# Patient Record
Sex: MALE | Race: WHITE | Employment: UNEMPLOYED | URBAN - NONMETROPOLITAN AREA
[De-identification: names, ages, dates, MRNs, and addresses within clinical notes are randomized per-mention and may not be internally consistent; named-entity substitution may affect disease eponyms.]

---

## 2018-08-03 ENCOUNTER — NURSE TRIAGE (OUTPATIENT)
Dept: ADMINISTRATIVE | Age: 18
End: 2018-08-03

## 2018-08-03 NOTE — TELEPHONE ENCOUNTER
Reason for Disposition   [1] Heart beating very rapidly (e.g., > 140 / minute) AND [2] present now  (Exception: during exercise)    Answer Assessment - Initial Assessment Questions  1. DESCRIPTION: \"Please describe your heart rate or heart beat that you are having\" (e.g., fast/slow, regular/irregular, skipped or extra beats, \"palpitations\")      States that he was in a hospital in [place] for suicidal thoughts and was released on 7/27 . The day before  Started taking Remeron for 4 days in the hospital and then he took one dose at home. Is pretty sure that is the med. But he is still so tired. Cannot seem to get over it   2. ONSET: \"When did it start? \" (Minutes, hours or days)       7/28  3. DURATION: \"How long does it last\" (e.g., seconds, minutes, hours)     contiinues tired-canceling things right and L as he is too tired- missed [employer] meeting this morning because he was too tired to go 4. PATTERN \"Does it come and go, or has it been constant since it started? \"  \"Does it get worse with exertion? \"   \"Are you feeling it now? \"      No-just week and feels like his heart is bounding some, cannot determine what the rate is  5. TAP: \"Using your hand, can you tap out what you are feeling on a chair or table in front of you, so that I can hear? \" (Note: not all patients can do this)        Did not have him do that   6. HEART RATE: \"Can you tell me your heart rate? \" \"How many beats in 15 seconds? \"  (Note: not all patients can do this)        Not sure   7. RECURRENT SYMPTOM: \"Have you ever had this before? \" If so, ask: \"When was the last time? \" and \"What happened that time? \"       This morning q  8. CAUSE: \"What do you think is causing the palpitations? \"      Thinks that it was the Remeron -  9. CARDIAC HISTORY: \"Do you have any history of heart disease? \" (e.g., heart attack, angina, bypass surgery, angioplasty, arrhythmia)       none  10. OTHER SYMPTOMS: \"Do you have any other symptoms? \" (e.g., dizziness, chest pain, sweating, difficulty breathing)      none  11. PREGNANCY: \"Is there any chance you are pregnant? \" \"When was your last menstrual period? \"      na    Protocols used: HEART RATE AND HEARTBEAT QUESTIONS-ADULT-AH

## 2018-10-08 ENCOUNTER — HOSPITAL ENCOUNTER (EMERGENCY)
Age: 18
Discharge: HOME | End: 2018-10-09
Payer: COMMERCIAL

## 2018-10-08 VITALS
HEIGHT: 65 IN | BODY MASS INDEX: 23.32 KG/M2 | HEART RATE: 81 BPM | RESPIRATION RATE: 17 BRPM | WEIGHT: 140 LBS | OXYGEN SATURATION: 98 % | TEMPERATURE: 98.4 F | DIASTOLIC BLOOD PRESSURE: 87 MMHG | SYSTOLIC BLOOD PRESSURE: 124 MMHG

## 2018-10-08 DIAGNOSIS — Z00.8 ENCOUNTER FOR PSYCHOLOGICAL EVALUATION: Primary | ICD-10-CM

## 2018-10-08 LAB
ACETAMINOPHEN LEVEL: < 5 UG/ML (ref 0–20)
ALBUMIN SERPL-MCNC: 5.6 G/DL (ref 3.5–5.1)
ALP BLD-CCNC: 72 U/L (ref 38–126)
ALT SERPL-CCNC: 19 U/L (ref 11–66)
AMPHETAMINE+METHAMPHETAMINE URINE SCREEN: NEGATIVE
ANION GAP SERPL CALCULATED.3IONS-SCNC: 16 MEQ/L (ref 8–16)
AST SERPL-CCNC: 26 U/L (ref 5–40)
BARBITURATE QUANTITATIVE URINE: NEGATIVE
BASOPHILS # BLD: 0.3 %
BASOPHILS ABSOLUTE: 0 THOU/MM3 (ref 0–0.1)
BENZODIAZEPINE QUANTITATIVE URINE: NEGATIVE
BILIRUB SERPL-MCNC: 3.3 MG/DL (ref 0.3–1.2)
BILIRUBIN DIRECT: < 0.2 MG/DL (ref 0–0.3)
BILIRUBIN URINE: NEGATIVE
BLOOD, URINE: NEGATIVE
BUN BLDV-MCNC: 10 MG/DL (ref 7–22)
CALCIUM SERPL-MCNC: 9.8 MG/DL (ref 8.5–10.5)
CANNABINOID QUANTITATIVE URINE: NEGATIVE
CHARACTER, URINE: CLEAR
CHLORIDE BLD-SCNC: 99 MEQ/L (ref 98–111)
CO2: 23 MEQ/L (ref 23–33)
COCAINE METABOLITE QUANTITATIVE URINE: NEGATIVE
COLOR: YELLOW
CREAT SERPL-MCNC: 0.7 MG/DL (ref 0.4–1.2)
EOSINOPHIL # BLD: 0.2 %
EOSINOPHILS ABSOLUTE: 0 THOU/MM3 (ref 0–0.4)
ERYTHROCYTE [DISTWIDTH] IN BLOOD BY AUTOMATED COUNT: 11.9 % (ref 11.5–14.5)
ERYTHROCYTE [DISTWIDTH] IN BLOOD BY AUTOMATED COUNT: 36.8 FL (ref 35–45)
ETHYL ALCOHOL, SERUM: < 0.01 %
GFR SERPL CREATININE-BSD FRML MDRD: > 90 ML/MIN/1.73M2
GLUCOSE BLD-MCNC: 94 MG/DL (ref 70–108)
GLUCOSE URINE: NEGATIVE MG/DL
HCT VFR BLD CALC: 47.2 % (ref 42–52)
HEMOGLOBIN: 17.4 GM/DL (ref 14–18)
IMMATURE GRANS (ABS): 0.03 THOU/MM3 (ref 0–0.07)
IMMATURE GRANULOCYTES: 0.3 %
KETONES, URINE: 15
LEUKOCYTE ESTERASE, URINE: NEGATIVE
LYMPHOCYTES # BLD: 9.8 %
LYMPHOCYTES ABSOLUTE: 1.1 THOU/MM3 (ref 1–4.8)
MCH RBC QN AUTO: 31.5 PG (ref 26–33)
MCHC RBC AUTO-ENTMCNC: 36.9 GM/DL (ref 32.2–35.5)
MCV RBC AUTO: 85.4 FL (ref 80–94)
MONOCYTES # BLD: 6 %
MONOCYTES ABSOLUTE: 0.7 THOU/MM3 (ref 0.4–1.3)
NITRITE, URINE: NEGATIVE
NUCLEATED RED BLOOD CELLS: 0 /100 WBC
OPIATES, URINE: NEGATIVE
OSMOLALITY CALCULATION: 274.5 MOSMOL/KG (ref 275–300)
OXYCODONE: NEGATIVE
PH UA: 6
PHENCYCLIDINE QUANTITATIVE URINE: NEGATIVE
PLATELET # BLD: 244 THOU/MM3 (ref 130–400)
PMV BLD AUTO: 10.1 FL (ref 9.4–12.4)
POTASSIUM SERPL-SCNC: 3.8 MEQ/L (ref 3.5–5.2)
PROTEIN UA: NEGATIVE
RBC # BLD: 5.53 MILL/MM3 (ref 4.7–6.1)
SALICYLATE, SERUM: < 0.3 MG/DL (ref 2–10)
SEG NEUTROPHILS: 83.4 %
SEGMENTED NEUTROPHILS ABSOLUTE COUNT: 9.3 THOU/MM3 (ref 1.8–7.7)
SODIUM BLD-SCNC: 138 MEQ/L (ref 135–145)
SPECIFIC GRAVITY, URINE: 1.01 (ref 1–1.03)
TOTAL PROTEIN: 8.2 G/DL (ref 6.1–8)
TSH SERPL DL<=0.05 MIU/L-ACNC: 2.14 UIU/ML (ref 0.4–4.2)
UROBILINOGEN, URINE: 0.2 EU/DL
WBC # BLD: 11.1 THOU/MM3 (ref 4.8–10.8)

## 2018-10-08 PROCEDURE — 99285 EMERGENCY DEPT VISIT HI MDM: CPT

## 2018-10-08 PROCEDURE — G0480 DRUG TEST DEF 1-7 CLASSES: HCPCS

## 2018-10-08 PROCEDURE — 36415 COLL VENOUS BLD VENIPUNCTURE: CPT

## 2018-10-08 PROCEDURE — 80053 COMPREHEN METABOLIC PANEL: CPT

## 2018-10-08 PROCEDURE — 82248 BILIRUBIN DIRECT: CPT

## 2018-10-08 PROCEDURE — 81003 URINALYSIS AUTO W/O SCOPE: CPT

## 2018-10-08 PROCEDURE — 84443 ASSAY THYROID STIM HORMONE: CPT

## 2018-10-08 PROCEDURE — 85025 COMPLETE CBC W/AUTO DIFF WBC: CPT

## 2018-10-08 PROCEDURE — 80307 DRUG TEST PRSMV CHEM ANLYZR: CPT

## 2018-10-08 ASSESSMENT — ENCOUNTER SYMPTOMS
VOMITING: 0
NAUSEA: 0
DIARRHEA: 0
COUGH: 0
CONSTIPATION: 0
ABDOMINAL PAIN: 0
COLOR CHANGE: 0
SORE THROAT: 0
EYE DISCHARGE: 0
EYE REDNESS: 0
BACK PAIN: 0
SHORTNESS OF BREATH: 0
RHINORRHEA: 0
WHEEZING: 0

## 2018-10-08 ASSESSMENT — PAIN DESCRIPTION - LOCATION: LOCATION: BACK;KNEE

## 2018-10-08 ASSESSMENT — PAIN SCALES - GENERAL: PAINLEVEL_OUTOF10: 8

## 2018-10-08 ASSESSMENT — SLEEP AND FATIGUE QUESTIONNAIRES
DO YOU USE A SLEEP AID: NO
DO YOU HAVE DIFFICULTY SLEEPING: NO
AVERAGE NUMBER OF SLEEP HOURS: 8

## 2018-10-08 ASSESSMENT — PAIN DESCRIPTION - PAIN TYPE: TYPE: ACUTE PAIN

## 2018-10-08 ASSESSMENT — LIFESTYLE VARIABLES: HISTORY_ALCOHOL_USE: NO

## 2018-10-08 ASSESSMENT — PAIN DESCRIPTION - ORIENTATION: ORIENTATION: RIGHT;LEFT

## 2018-10-09 NOTE — ED PROVIDER NOTES
for the following: Alb 5.6 (*)     Total Bilirubin 3.3 (*)     Total Protein 8.2 (*)     All other components within normal limits   SALICYLATE LEVEL - Abnormal; Notable for the following:     Salicylate, Serum < 0.3 (*)     All other components within normal limits   URINE RT REFLEX TO CULTURE - Abnormal; Notable for the following:     Ketones, Urine 15 (*)     All other components within normal limits   OSMOLALITY - Abnormal; Notable for the following:     Osmolality Calc 274.5 (*)     All other components within normal limits   BASIC METABOLIC PANEL   TSH WITHOUT REFLEX   ACETAMINOPHEN LEVEL   ETHANOL   URINE DRUG SCREEN   ANION GAP   GLOMERULAR FILTRATION RATE, ESTIMATED       EMERGENCY DEPARTMENT COURSE:   Vitals:    Vitals:    10/08/18 2132   BP: 124/87   Pulse: 81   Resp: 17   Temp: 98.4 °F (36.9 °C)   TempSrc: Oral   SpO2: 98%   Weight: 140 lb (63.5 kg)   Height: 5' 5\" (1.651 m)     The patient was seen and evaluated within the ED today after making suicidal statements. Within the department, I observed the patient's vital signs to be within acceptable range. His physical exam was unremarkable. Laboratory work was reassuring. I observed the patient's condition to remain stable during the duration of the stay. Dr. Ranulfo Nihcols, Psychiatry, does not believe that the patient is at risk of harming himself or others and recommends discharge to home with outpatient psychiatric follow-up per DEYANIRA. DEYANIRA explained the proposed course of treatment to the patient, who was amenable to the treatment and discharge decisions per Psychiatry. He was discharged home in stable condition, and the patient will return to the ED if the symptoms become more severe in nature or otherwise change.     I estimate there is LOW risk for SUICIDAL BEHAVIOR, HOMICIDAL BEHAVIOR, PSYCHOSIS, DANGEROUS OR VIOLENT BEHAVIOR, DISORIENTATION, OR MENTAL HEALTH CONDITION REQUIRING HOSPITALIZATION, thus I consider the discharge disposition

## 2018-10-09 NOTE — ED NOTES
PT is resting on cart quietly. Pt is given food per provider. Pt is updated on POC. Sitter at bedside. Will continue to monitor the patient.      Nkechi Vaughn RN  10/08/18 8861

## 2018-10-09 NOTE — PROGRESS NOTES
Provisional Diagnosis:   Mood Disorder NOS     Psychosocial and Contextual Factors:   Problems with primary support system    C-SSRS Summary:      Patient: X  Family: X  Agency: [place] [employer], [facility]    Substance Abuse  Denies     Present Suicidal Behavior:      Verbal: Denies     Attempt:  Denies     Past Suicidal Behavior:     Verbal: History of suicidal thoughts    Attempt: Denies       Self-Injurious/Self-Mutilation:  Denies     Trauma Identified: \"Tons\", no further details disclosed       Protective Factors:  Pts family are supportive, pt is linked to family and individual counseling       Risk Factors:  Problems with primary support system, non-compliant with taking prescribed psychotropic medication, history of suicidal thoughts, access to weapon at home, history of inpatient psychiatric treatment    Clinical Summary:  Pt is a 19year old male escorted to [facility] ED by [place] from the [facility] in [place] due to suicidal thoughts. Police informed ED Staff that pt made suicidal statements to his counselor. Pt reportedly was at a family counseling session with his mother, father and sister. Pt state \"I tried to express my feelings of having no negative intent\". In doing so pt reportedly used the following analogy \"I would blow my brains out but I don't have negative intent\". Pt state his words were misconstrued and state \"I would never kill myself\". Pt has access to to a gun at home \"I'm not going to  the gun, take it and use it\". Pt lives with his mother, father and sister. Pt denies current suicidal/homicidal thought, denies hallucinations, is preoccupied with God. Clinician inquired about a history of suicidal thoughts, pt stated \"go to another question\". Clinician inquired if pt had a history of inpatient psychiatric treatment and received the same response. Pt then stated \"God is asking me to tell you yes to both of those questions\".   Pt denies a history of suicide

## 2018-12-29 ENCOUNTER — HOSPITAL ENCOUNTER (INPATIENT)
Age: 18
LOS: 4 days | Discharge: HOME | DRG: 885 | End: 2019-01-02
Attending: PSYCHIATRY & NEUROLOGY | Admitting: PSYCHIATRY & NEUROLOGY
Payer: COMMERCIAL

## 2018-12-29 PROCEDURE — 1240000000 HC EMOTIONAL WELLNESS R&B

## 2018-12-29 PROCEDURE — 6370000000 HC RX 637 (ALT 250 FOR IP): Performed by: NURSE PRACTITIONER

## 2018-12-29 PROCEDURE — APPSS60 APP SPLIT SHARED TIME 46-60 MINUTES: Performed by: NURSE PRACTITIONER

## 2018-12-29 PROCEDURE — 99222 1ST HOSP IP/OBS MODERATE 55: CPT | Performed by: PSYCHIATRY & NEUROLOGY

## 2018-12-29 RX ORDER — HYDROXYZINE PAMOATE 50 MG/1
50 CAPSULE ORAL 3 TIMES DAILY PRN
Status: DISCONTINUED | OUTPATIENT
Start: 2018-12-29 | End: 2019-01-02 | Stop reason: HOSPADM

## 2018-12-29 RX ORDER — TRAZODONE HYDROCHLORIDE 50 MG/1
50 TABLET ORAL NIGHTLY PRN
Status: DISCONTINUED | OUTPATIENT
Start: 2018-12-29 | End: 2019-01-02 | Stop reason: HOSPADM

## 2018-12-29 RX ORDER — MAGNESIUM HYDROXIDE/ALUMINUM HYDROXICE/SIMETHICONE 120; 1200; 1200 MG/30ML; MG/30ML; MG/30ML
30 SUSPENSION ORAL EVERY 6 HOURS PRN
Status: DISCONTINUED | OUTPATIENT
Start: 2018-12-29 | End: 2019-01-02 | Stop reason: HOSPADM

## 2018-12-29 RX ORDER — PALIPERIDONE 3 MG/1
3 TABLET, EXTENDED RELEASE ORAL DAILY
Status: DISCONTINUED | OUTPATIENT
Start: 2018-12-29 | End: 2018-12-30

## 2018-12-29 RX ORDER — ACETAMINOPHEN 325 MG/1
650 TABLET ORAL EVERY 4 HOURS PRN
Status: DISCONTINUED | OUTPATIENT
Start: 2018-12-29 | End: 2019-01-02 | Stop reason: HOSPADM

## 2018-12-29 RX ADMIN — PALIPERIDONE 3 MG: 3 TABLET, EXTENDED RELEASE ORAL at 11:26

## 2018-12-29 RX ADMIN — PALIPERIDONE 3 MG: 3 TABLET, EXTENDED RELEASE ORAL at 10:01

## 2018-12-29 ASSESSMENT — PAIN SCALES - GENERAL
PAINLEVEL_OUTOF10: 3
PAINLEVEL_OUTOF10: 3

## 2018-12-29 ASSESSMENT — PAIN DESCRIPTION - ORIENTATION: ORIENTATION: POSTERIOR

## 2018-12-29 ASSESSMENT — PAIN DESCRIPTION - ONSET
ONSET: ON-GOING
ONSET: ON-GOING

## 2018-12-29 ASSESSMENT — PAIN DESCRIPTION - FREQUENCY
FREQUENCY: CONTINUOUS
FREQUENCY: CONTINUOUS

## 2018-12-29 ASSESSMENT — PAIN DESCRIPTION - PROGRESSION
CLINICAL_PROGRESSION: NOT CHANGED
CLINICAL_PROGRESSION: NOT CHANGED

## 2018-12-29 ASSESSMENT — PAIN DESCRIPTION - LOCATION: LOCATION: NECK

## 2018-12-29 ASSESSMENT — PAIN DESCRIPTION - DESCRIPTORS
DESCRIPTORS: ACHING
DESCRIPTORS: ACHING

## 2018-12-29 ASSESSMENT — PAIN DESCRIPTION - PAIN TYPE
TYPE: CHRONIC PAIN
TYPE: CHRONIC PAIN

## 2018-12-30 PROBLEM — F33.9 MAJOR DEPRESSIVE DISORDER, RECURRENT (HCC): Status: ACTIVE | Noted: 2018-12-30

## 2018-12-30 PROCEDURE — 99232 SBSQ HOSP IP/OBS MODERATE 35: CPT | Performed by: PSYCHIATRY & NEUROLOGY

## 2018-12-30 PROCEDURE — 1240000000 HC EMOTIONAL WELLNESS R&B

## 2018-12-30 PROCEDURE — APPSS15 APP SPLIT SHARED TIME 0-15 MINUTES: Performed by: NURSE PRACTITIONER

## 2018-12-30 PROCEDURE — 6370000000 HC RX 637 (ALT 250 FOR IP): Performed by: NURSE PRACTITIONER

## 2018-12-30 RX ORDER — PALIPERIDONE 3 MG/1
3 TABLET, EXTENDED RELEASE ORAL 2 TIMES DAILY
Status: DISCONTINUED | OUTPATIENT
Start: 2018-12-30 | End: 2019-01-02

## 2018-12-30 RX ADMIN — PALIPERIDONE 3 MG: 3 TABLET, EXTENDED RELEASE ORAL at 08:49

## 2018-12-30 ASSESSMENT — PAIN SCALES - GENERAL
PAINLEVEL_OUTOF10: 0
PAINLEVEL_OUTOF10: 0

## 2018-12-31 PROCEDURE — 1240000000 HC EMOTIONAL WELLNESS R&B

## 2018-12-31 PROCEDURE — 6370000000 HC RX 637 (ALT 250 FOR IP): Performed by: PSYCHIATRY & NEUROLOGY

## 2018-12-31 PROCEDURE — 90833 PSYTX W PT W E/M 30 MIN: CPT | Performed by: PSYCHIATRY & NEUROLOGY

## 2018-12-31 PROCEDURE — 6360000002 HC RX W HCPCS: Performed by: PSYCHIATRY & NEUROLOGY

## 2018-12-31 PROCEDURE — 99232 SBSQ HOSP IP/OBS MODERATE 35: CPT | Performed by: PSYCHIATRY & NEUROLOGY

## 2018-12-31 RX ORDER — LORAZEPAM 2 MG/ML
1 INJECTION INTRAMUSCULAR ONCE
Status: COMPLETED | OUTPATIENT
Start: 2018-12-31 | End: 2018-12-31

## 2018-12-31 RX ORDER — BENZTROPINE MESYLATE 1 MG/1
0.5 TABLET ORAL 2 TIMES DAILY
Status: DISCONTINUED | OUTPATIENT
Start: 2018-12-31 | End: 2019-01-02

## 2018-12-31 RX ADMIN — LORAZEPAM 1 MG: 2 INJECTION, SOLUTION INTRAMUSCULAR; INTRAVENOUS at 10:13

## 2018-12-31 RX ADMIN — PALIPERIDONE 3 MG: 3 TABLET, EXTENDED RELEASE ORAL at 20:42

## 2018-12-31 RX ADMIN — PALIPERIDONE 3 MG: 3 TABLET, EXTENDED RELEASE ORAL at 10:17

## 2018-12-31 RX ADMIN — ACETAMINOPHEN 650 MG: 325 TABLET ORAL at 20:42

## 2018-12-31 RX ADMIN — BENZTROPINE MESYLATE 0.5 MG: 1 TABLET ORAL at 10:11

## 2018-12-31 ASSESSMENT — SLEEP AND FATIGUE QUESTIONNAIRES
DIFFICULTY STAYING ASLEEP: YES
DIFFICULTY ARISING: YES
DO YOU HAVE DIFFICULTY SLEEPING: YES
DIFFICULTY FALLING ASLEEP: YES
DO YOU USE A SLEEP AID: NO
AVERAGE NUMBER OF SLEEP HOURS: 5
RESTFUL SLEEP: NO
SLEEP PATTERN: DIFFICULTY FALLING ASLEEP;DIFFICULTY ARISING;DISTURBED/INTERRUPTED SLEEP;RESTLESSNESS

## 2018-12-31 ASSESSMENT — PAIN SCALES - GENERAL
PAINLEVEL_OUTOF10: 2
PAINLEVEL_OUTOF10: 0

## 2018-12-31 ASSESSMENT — PAIN DESCRIPTION - LOCATION
LOCATION: ELBOW
LOCATION: BACK

## 2018-12-31 ASSESSMENT — PAIN DESCRIPTION - ONSET
ONSET: ON-GOING
ONSET: ON-GOING

## 2018-12-31 ASSESSMENT — LIFESTYLE VARIABLES: HISTORY_ALCOHOL_USE: NO

## 2018-12-31 ASSESSMENT — PAIN DESCRIPTION - DIRECTION: RADIATING_TOWARDS: ALL OVER

## 2018-12-31 ASSESSMENT — PAIN DESCRIPTION - PROGRESSION
CLINICAL_PROGRESSION: NOT CHANGED
CLINICAL_PROGRESSION: NOT CHANGED

## 2018-12-31 ASSESSMENT — PAIN DESCRIPTION - PAIN TYPE
TYPE: CHRONIC PAIN
TYPE: CHRONIC PAIN

## 2018-12-31 ASSESSMENT — PAIN DESCRIPTION - DESCRIPTORS
DESCRIPTORS: ACHING
DESCRIPTORS: ACHING

## 2018-12-31 ASSESSMENT — PAIN DESCRIPTION - FREQUENCY
FREQUENCY: INTERMITTENT
FREQUENCY: INTERMITTENT

## 2018-12-31 ASSESSMENT — PAIN DESCRIPTION - ORIENTATION: ORIENTATION: UPPER

## 2018-12-31 ASSESSMENT — PATIENT HEALTH QUESTIONNAIRE - PHQ9: SUM OF ALL RESPONSES TO PHQ QUESTIONS 1-9: 18

## 2019-01-01 PROCEDURE — 90833 PSYTX W PT W E/M 30 MIN: CPT | Performed by: PSYCHIATRY & NEUROLOGY

## 2019-01-01 PROCEDURE — 6370000000 HC RX 637 (ALT 250 FOR IP): Performed by: PSYCHIATRY & NEUROLOGY

## 2019-01-01 PROCEDURE — 1240000000 HC EMOTIONAL WELLNESS R&B

## 2019-01-01 PROCEDURE — 99232 SBSQ HOSP IP/OBS MODERATE 35: CPT | Performed by: PSYCHIATRY & NEUROLOGY

## 2019-01-01 RX ADMIN — BENZTROPINE MESYLATE 0.5 MG: 1 TABLET ORAL at 08:04

## 2019-01-01 RX ADMIN — HYDROXYZINE PAMOATE 50 MG: 50 CAPSULE ORAL at 10:48

## 2019-01-01 RX ADMIN — PALIPERIDONE 3 MG: 3 TABLET, EXTENDED RELEASE ORAL at 08:04

## 2019-01-01 RX ADMIN — PALIPERIDONE 3 MG: 3 TABLET, EXTENDED RELEASE ORAL at 20:24

## 2019-01-01 ASSESSMENT — PAIN SCALES - GENERAL
PAINLEVEL_OUTOF10: 0
PAINLEVEL_OUTOF10: 0

## 2019-01-02 VITALS
DIASTOLIC BLOOD PRESSURE: 75 MMHG | TEMPERATURE: 98 F | OXYGEN SATURATION: 99 % | SYSTOLIC BLOOD PRESSURE: 126 MMHG | RESPIRATION RATE: 18 BRPM | BODY MASS INDEX: 22.49 KG/M2 | HEART RATE: 91 BPM | WEIGHT: 135 LBS | HEIGHT: 65 IN

## 2019-01-02 PROCEDURE — 6370000000 HC RX 637 (ALT 250 FOR IP): Performed by: PSYCHIATRY & NEUROLOGY

## 2019-01-02 PROCEDURE — 99239 HOSP IP/OBS DSCHRG MGMT >30: CPT | Performed by: PSYCHIATRY & NEUROLOGY

## 2019-01-02 PROCEDURE — 5130000000 HC BRIDGE APPOINTMENT

## 2019-01-02 RX ORDER — BENZTROPINE MESYLATE 1 MG/1
0.5 TABLET ORAL NIGHTLY
Status: DISCONTINUED | OUTPATIENT
Start: 2019-01-03 | End: 2019-01-02 | Stop reason: HOSPADM

## 2019-01-02 RX ORDER — BENZTROPINE MESYLATE 0.5 MG/1
0.5 TABLET ORAL 2 TIMES DAILY
Qty: 60 TABLET | Refills: 0 | Status: CANCELLED | OUTPATIENT
Start: 2019-01-02

## 2019-01-02 RX ORDER — BENZTROPINE MESYLATE 0.5 MG/1
0.5 TABLET ORAL NIGHTLY
Qty: 30 TABLET | Refills: 0 | Status: ON HOLD | OUTPATIENT
Start: 2019-01-03 | End: 2019-01-22

## 2019-01-02 RX ORDER — PALIPERIDONE 3 MG/1
3 TABLET, EXTENDED RELEASE ORAL DAILY
Qty: 7 TABLET | Refills: 0 | Status: ON HOLD | OUTPATIENT
Start: 2019-01-03 | End: 2019-01-22 | Stop reason: HOSPADM

## 2019-01-02 RX ORDER — PALIPERIDONE 3 MG/1
3 TABLET, EXTENDED RELEASE ORAL 2 TIMES DAILY
Qty: 14 TABLET | Refills: 0 | Status: CANCELLED | OUTPATIENT
Start: 2019-01-02

## 2019-01-02 RX ORDER — PALIPERIDONE 3 MG/1
3 TABLET, EXTENDED RELEASE ORAL DAILY
Status: DISCONTINUED | OUTPATIENT
Start: 2019-01-03 | End: 2019-01-02 | Stop reason: HOSPADM

## 2019-01-02 RX ADMIN — PALIPERIDONE 3 MG: 3 TABLET, EXTENDED RELEASE ORAL at 08:44

## 2019-01-02 ASSESSMENT — PAIN SCALES - GENERAL: PAINLEVEL_OUTOF10: 0

## 2019-01-03 ENCOUNTER — TELEPHONE (OUTPATIENT)
Dept: ADMINISTRATIVE | Age: 19
End: 2019-01-03

## 2019-01-09 ENCOUNTER — HOSPITAL ENCOUNTER (OUTPATIENT)
Dept: PSYCHIATRY | Age: 19
Setting detail: THERAPIES SERIES
Discharge: HOME | End: 2019-01-09
Payer: COMMERCIAL

## 2019-01-09 PROCEDURE — 5130000000 HC BRIDGE APPOINTMENT

## 2019-01-13 ENCOUNTER — HOSPITAL ENCOUNTER (INPATIENT)
Age: 19
LOS: 9 days | Discharge: HOME | DRG: 885 | End: 2019-01-22
Attending: FAMILY MEDICINE | Admitting: PSYCHIATRY & NEUROLOGY
Payer: COMMERCIAL

## 2019-01-13 DIAGNOSIS — R45.851 SUICIDAL IDEATION: ICD-10-CM

## 2019-01-13 DIAGNOSIS — F20.9 SCHIZOPHRENIA, UNSPECIFIED TYPE (HCC): Primary | ICD-10-CM

## 2019-01-13 DIAGNOSIS — T88.7XXA MEDICATION SIDE EFFECT: ICD-10-CM

## 2019-01-13 DIAGNOSIS — R00.0 SINUS TACHYCARDIA: ICD-10-CM

## 2019-01-13 LAB
ALBUMIN SERPL-MCNC: 4.9 G/DL (ref 3.5–5.1)
ALP BLD-CCNC: 72 U/L (ref 38–126)
ALT SERPL-CCNC: 13 U/L (ref 11–66)
AMPHETAMINE+METHAMPHETAMINE URINE SCREEN: NEGATIVE
ANION GAP SERPL CALCULATED.3IONS-SCNC: 13 MEQ/L (ref 8–16)
AST SERPL-CCNC: 18 U/L (ref 5–40)
BACTERIA: ABNORMAL /HPF
BARBITURATE QUANTITATIVE URINE: NEGATIVE
BASOPHILS # BLD: 0.6 %
BASOPHILS ABSOLUTE: 0 THOU/MM3 (ref 0–0.1)
BENZODIAZEPINE QUANTITATIVE URINE: NEGATIVE
BILIRUB SERPL-MCNC: 1.8 MG/DL (ref 0.3–1.2)
BILIRUBIN DIRECT: 0.3 MG/DL (ref 0–0.3)
BILIRUBIN URINE: NEGATIVE
BLOOD, URINE: NEGATIVE
BUN BLDV-MCNC: 9 MG/DL (ref 7–22)
CALCIUM SERPL-MCNC: 9.4 MG/DL (ref 8.5–10.5)
CANNABINOID QUANTITATIVE URINE: NEGATIVE
CASTS 2: ABNORMAL /LPF
CASTS UA: ABNORMAL /LPF
CHARACTER, URINE: ABNORMAL
CHLORIDE BLD-SCNC: 102 MEQ/L (ref 98–111)
CO2: 26 MEQ/L (ref 23–33)
COCAINE METABOLITE QUANTITATIVE URINE: NEGATIVE
COLOR: YELLOW
CREAT SERPL-MCNC: 0.6 MG/DL (ref 0.4–1.2)
CRYSTALS, UA: ABNORMAL
EKG ATRIAL RATE: 115 BPM
EKG P AXIS: 72 DEGREES
EKG P-R INTERVAL: 150 MS
EKG Q-T INTERVAL: 334 MS
EKG QRS DURATION: 88 MS
EKG QTC CALCULATION (BAZETT): 462 MS
EKG R AXIS: 92 DEGREES
EKG T AXIS: 41 DEGREES
EKG VENTRICULAR RATE: 115 BPM
EOSINOPHIL # BLD: 1 %
EOSINOPHILS ABSOLUTE: 0.1 THOU/MM3 (ref 0–0.4)
EPITHELIAL CELLS, UA: ABNORMAL /HPF
ERYTHROCYTE [DISTWIDTH] IN BLOOD BY AUTOMATED COUNT: 11.8 % (ref 11.5–14.5)
ERYTHROCYTE [DISTWIDTH] IN BLOOD BY AUTOMATED COUNT: 37.5 FL (ref 35–45)
ETHYL ALCOHOL, SERUM: < 0.01 %
GFR SERPL CREATININE-BSD FRML MDRD: > 90 ML/MIN/1.73M2
GLUCOSE BLD-MCNC: 101 MG/DL (ref 70–108)
GLUCOSE URINE: NEGATIVE MG/DL
HCT VFR BLD CALC: 44.7 % (ref 42–52)
HEMOGLOBIN: 15.9 GM/DL (ref 14–18)
IMMATURE GRANS (ABS): 0.01 THOU/MM3 (ref 0–0.07)
IMMATURE GRANULOCYTES: 0.1 %
KETONES, URINE: NEGATIVE
LEUKOCYTE ESTERASE, URINE: NEGATIVE
LYMPHOCYTES # BLD: 15.5 %
LYMPHOCYTES ABSOLUTE: 1.1 THOU/MM3 (ref 1–4.8)
MAGNESIUM: 2 MG/DL (ref 1.6–2.4)
MCH RBC QN AUTO: 31.1 PG (ref 26–33)
MCHC RBC AUTO-ENTMCNC: 35.6 GM/DL (ref 32.2–35.5)
MCV RBC AUTO: 87.3 FL (ref 80–94)
MISCELLANEOUS 2: ABNORMAL
MONOCYTES # BLD: 5.5 %
MONOCYTES ABSOLUTE: 0.4 THOU/MM3 (ref 0.4–1.3)
NITRITE, URINE: NEGATIVE
NUCLEATED RED BLOOD CELLS: 0 /100 WBC
OPIATES, URINE: NEGATIVE
OSMOLALITY CALCULATION: 280.1 MOSMOL/KG (ref 275–300)
OXYCODONE: NEGATIVE
PH UA: 7.5
PHENCYCLIDINE QUANTITATIVE URINE: NEGATIVE
PLATELET # BLD: 209 THOU/MM3 (ref 130–400)
PMV BLD AUTO: 9.3 FL (ref 9.4–12.4)
POTASSIUM SERPL-SCNC: 3.6 MEQ/L (ref 3.5–5.2)
PRO-BNP: 26.7 PG/ML (ref 0–450)
PROTEIN UA: NEGATIVE
RBC # BLD: 5.12 MILL/MM3 (ref 4.7–6.1)
RBC URINE: ABNORMAL /HPF
RENAL EPITHELIAL, UA: ABNORMAL
SALICYLATE, SERUM: < 0.3 MG/DL (ref 2–10)
SEG NEUTROPHILS: 77.3 %
SEGMENTED NEUTROPHILS ABSOLUTE COUNT: 5.3 THOU/MM3 (ref 1.8–7.7)
SODIUM BLD-SCNC: 141 MEQ/L (ref 135–145)
SPECIFIC GRAVITY, URINE: 1.01 (ref 1–1.03)
TOTAL PROTEIN: 7.4 G/DL (ref 6.1–8)
TROPONIN T: < 0.01 NG/ML
TSH SERPL DL<=0.05 MIU/L-ACNC: 1.32 UIU/ML (ref 0.4–4.2)
UROBILINOGEN, URINE: 0.2 EU/DL
WBC # BLD: 6.9 THOU/MM3 (ref 4.8–10.8)
WBC UA: ABNORMAL /HPF
YEAST: ABNORMAL

## 2019-01-13 PROCEDURE — 93005 ELECTROCARDIOGRAM TRACING: CPT | Performed by: FAMILY MEDICINE

## 2019-01-13 PROCEDURE — 83735 ASSAY OF MAGNESIUM: CPT

## 2019-01-13 PROCEDURE — G0480 DRUG TEST DEF 1-7 CLASSES: HCPCS

## 2019-01-13 PROCEDURE — 85025 COMPLETE CBC W/AUTO DIFF WBC: CPT

## 2019-01-13 PROCEDURE — 80307 DRUG TEST PRSMV CHEM ANLYZR: CPT

## 2019-01-13 PROCEDURE — 80076 HEPATIC FUNCTION PANEL: CPT

## 2019-01-13 PROCEDURE — 2580000003 HC RX 258: Performed by: FAMILY MEDICINE

## 2019-01-13 PROCEDURE — 84443 ASSAY THYROID STIM HORMONE: CPT

## 2019-01-13 PROCEDURE — 96374 THER/PROPH/DIAG INJ IV PUSH: CPT

## 2019-01-13 PROCEDURE — 83880 ASSAY OF NATRIURETIC PEPTIDE: CPT

## 2019-01-13 PROCEDURE — 81001 URINALYSIS AUTO W/SCOPE: CPT

## 2019-01-13 PROCEDURE — 1240000000 HC EMOTIONAL WELLNESS R&B

## 2019-01-13 PROCEDURE — 99285 EMERGENCY DEPT VISIT HI MDM: CPT

## 2019-01-13 PROCEDURE — 80048 BASIC METABOLIC PNL TOTAL CA: CPT

## 2019-01-13 PROCEDURE — 84484 ASSAY OF TROPONIN QUANT: CPT

## 2019-01-13 PROCEDURE — 6360000002 HC RX W HCPCS: Performed by: FAMILY MEDICINE

## 2019-01-13 PROCEDURE — 36415 COLL VENOUS BLD VENIPUNCTURE: CPT

## 2019-01-13 PROCEDURE — 6370000000 HC RX 637 (ALT 250 FOR IP): Performed by: FAMILY MEDICINE

## 2019-01-13 RX ORDER — ACETAMINOPHEN 325 MG/1
650 TABLET ORAL EVERY 4 HOURS PRN
Status: DISCONTINUED | OUTPATIENT
Start: 2019-01-13 | End: 2019-01-22 | Stop reason: HOSPADM

## 2019-01-13 RX ORDER — LORAZEPAM 1 MG/1
1 TABLET ORAL 3 TIMES DAILY PRN
Status: DISCONTINUED | OUTPATIENT
Start: 2019-01-13 | End: 2019-01-14

## 2019-01-13 RX ORDER — HYDROXYZINE PAMOATE 50 MG/1
50 CAPSULE ORAL 3 TIMES DAILY PRN
Status: DISCONTINUED | OUTPATIENT
Start: 2019-01-13 | End: 2019-01-22 | Stop reason: HOSPADM

## 2019-01-13 RX ORDER — LORAZEPAM 2 MG/ML
1 INJECTION INTRAMUSCULAR 3 TIMES DAILY PRN
Status: DISCONTINUED | OUTPATIENT
Start: 2019-01-13 | End: 2019-01-14

## 2019-01-13 RX ORDER — LORAZEPAM 1 MG/1
1 TABLET ORAL EVERY 6 HOURS PRN
Status: ON HOLD | COMMUNITY
End: 2019-01-22 | Stop reason: HOSPADM

## 2019-01-13 RX ORDER — 0.9 % SODIUM CHLORIDE 0.9 %
2000 INTRAVENOUS SOLUTION INTRAVENOUS ONCE
Status: COMPLETED | OUTPATIENT
Start: 2019-01-13 | End: 2019-01-13

## 2019-01-13 RX ORDER — TRAZODONE HYDROCHLORIDE 50 MG/1
50 TABLET ORAL NIGHTLY PRN
Status: DISCONTINUED | OUTPATIENT
Start: 2019-01-13 | End: 2019-01-22 | Stop reason: HOSPADM

## 2019-01-13 RX ORDER — LORAZEPAM 1 MG/1
1 TABLET ORAL ONCE
Status: COMPLETED | OUTPATIENT
Start: 2019-01-13 | End: 2019-01-13

## 2019-01-13 RX ORDER — DIPHENHYDRAMINE HYDROCHLORIDE 50 MG/ML
25 INJECTION INTRAMUSCULAR; INTRAVENOUS ONCE
Status: COMPLETED | OUTPATIENT
Start: 2019-01-13 | End: 2019-01-13

## 2019-01-13 RX ORDER — MAGNESIUM HYDROXIDE/ALUMINUM HYDROXICE/SIMETHICONE 120; 1200; 1200 MG/30ML; MG/30ML; MG/30ML
30 SUSPENSION ORAL EVERY 6 HOURS PRN
Status: DISCONTINUED | OUTPATIENT
Start: 2019-01-13 | End: 2019-01-22 | Stop reason: HOSPADM

## 2019-01-13 RX ADMIN — DIPHENHYDRAMINE HYDROCHLORIDE 25 MG: 50 INJECTION, SOLUTION INTRAMUSCULAR; INTRAVENOUS at 18:38

## 2019-01-13 RX ADMIN — LORAZEPAM 1 MG: 1 TABLET ORAL at 19:34

## 2019-01-13 RX ADMIN — SODIUM CHLORIDE 2000 ML: 9 INJECTION, SOLUTION INTRAVENOUS at 17:04

## 2019-01-13 ASSESSMENT — ENCOUNTER SYMPTOMS
DIARRHEA: 0
SHORTNESS OF BREATH: 0
WHEEZING: 0
VOMITING: 0
SORE THROAT: 0
COUGH: 0
ABDOMINAL PAIN: 0
EYE REDNESS: 0
NAUSEA: 0
BACK PAIN: 0
EYE DISCHARGE: 0
RHINORRHEA: 0

## 2019-01-13 ASSESSMENT — SLEEP AND FATIGUE QUESTIONNAIRES
DIFFICULTY STAYING ASLEEP: YES
DO YOU USE A SLEEP AID: NO
DO YOU HAVE DIFFICULTY SLEEPING: YES
DIFFICULTY ARISING: YES
AVERAGE NUMBER OF SLEEP HOURS: 8
DIFFICULTY FALLING ASLEEP: YES
SLEEP PATTERN: RESTLESSNESS
DIFFICULTY FALLING ASLEEP: YES
RESTFUL SLEEP: YES
DIFFICULTY STAYING ASLEEP: YES
DO YOU USE A SLEEP AID: NO
AVERAGE NUMBER OF SLEEP HOURS: 8
DIFFICULTY ARISING: YES
DO YOU HAVE DIFFICULTY SLEEPING: YES
RESTFUL SLEEP: YES

## 2019-01-13 ASSESSMENT — LIFESTYLE VARIABLES
HISTORY_ALCOHOL_USE: NO
HISTORY_ALCOHOL_USE: NO

## 2019-01-13 ASSESSMENT — PAIN SCALES - WONG BAKER: WONGBAKER_NUMERICALRESPONSE: 4

## 2019-01-13 ASSESSMENT — PATIENT HEALTH QUESTIONNAIRE - PHQ9
SUM OF ALL RESPONSES TO PHQ QUESTIONS 1-9: 23
SUM OF ALL RESPONSES TO PHQ QUESTIONS 1-9: 23

## 2019-01-13 ASSESSMENT — PAIN DESCRIPTION - PAIN TYPE: TYPE: ACUTE PAIN

## 2019-01-13 ASSESSMENT — PAIN DESCRIPTION - LOCATION: LOCATION: CHEST

## 2019-01-13 ASSESSMENT — PAIN SCALES - GENERAL: PAINLEVEL_OUTOF10: 0

## 2019-01-14 PROCEDURE — 6360000002 HC RX W HCPCS

## 2019-01-14 PROCEDURE — 93010 ELECTROCARDIOGRAM REPORT: CPT | Performed by: NUCLEAR MEDICINE

## 2019-01-14 PROCEDURE — 6370000000 HC RX 637 (ALT 250 FOR IP): Performed by: PSYCHIATRY & NEUROLOGY

## 2019-01-14 PROCEDURE — 1240000000 HC EMOTIONAL WELLNESS R&B

## 2019-01-14 PROCEDURE — 6370000000 HC RX 637 (ALT 250 FOR IP): Performed by: PHYSICIAN ASSISTANT

## 2019-01-14 PROCEDURE — 99222 1ST HOSP IP/OBS MODERATE 55: CPT | Performed by: PSYCHIATRY & NEUROLOGY

## 2019-01-14 PROCEDURE — APPSS30 APP SPLIT SHARED TIME 16-30 MINUTES: Performed by: PHYSICIAN ASSISTANT

## 2019-01-14 RX ORDER — DIPHENHYDRAMINE HYDROCHLORIDE 50 MG/ML
25 INJECTION INTRAMUSCULAR; INTRAVENOUS ONCE
Status: DISCONTINUED | OUTPATIENT
Start: 2019-01-14 | End: 2019-01-19

## 2019-01-14 RX ORDER — PROPRANOLOL HYDROCHLORIDE 10 MG/1
10 TABLET ORAL 3 TIMES DAILY
Status: DISCONTINUED | OUTPATIENT
Start: 2019-01-14 | End: 2019-01-16

## 2019-01-14 RX ORDER — DIPHENHYDRAMINE HYDROCHLORIDE 50 MG/ML
25 INJECTION INTRAMUSCULAR; INTRAVENOUS EVERY 6 HOURS PRN
Status: DISCONTINUED | OUTPATIENT
Start: 2019-01-14 | End: 2019-01-14

## 2019-01-14 RX ORDER — LORAZEPAM 0.5 MG/1
0.5 TABLET ORAL 3 TIMES DAILY PRN
Status: DISCONTINUED | OUTPATIENT
Start: 2019-01-14 | End: 2019-01-22 | Stop reason: HOSPADM

## 2019-01-14 RX ORDER — LORAZEPAM 2 MG/ML
0.5 INJECTION INTRAMUSCULAR 3 TIMES DAILY PRN
Status: DISCONTINUED | OUTPATIENT
Start: 2019-01-14 | End: 2019-01-22 | Stop reason: HOSPADM

## 2019-01-14 RX ORDER — DIPHENHYDRAMINE HYDROCHLORIDE 50 MG/ML
INJECTION INTRAMUSCULAR; INTRAVENOUS
Status: COMPLETED
Start: 2019-01-14 | End: 2019-01-14

## 2019-01-14 RX ADMIN — PROPRANOLOL HYDROCHLORIDE 10 MG: 10 TABLET ORAL at 21:11

## 2019-01-14 RX ADMIN — HYDROXYZINE PAMOATE 50 MG: 50 CAPSULE ORAL at 15:24

## 2019-01-14 RX ADMIN — PROPRANOLOL HYDROCHLORIDE 10 MG: 10 TABLET ORAL at 11:28

## 2019-01-14 RX ADMIN — ACETAMINOPHEN 650 MG: 325 TABLET ORAL at 09:25

## 2019-01-14 RX ADMIN — DIPHENHYDRAMINE HYDROCHLORIDE 25 MG: 50 INJECTION, SOLUTION INTRAMUSCULAR; INTRAVENOUS at 18:38

## 2019-01-14 RX ADMIN — ACETAMINOPHEN 650 MG: 325 TABLET ORAL at 16:40

## 2019-01-14 RX ADMIN — ACETAMINOPHEN 650 MG: 325 TABLET ORAL at 21:11

## 2019-01-14 RX ADMIN — PROPRANOLOL HYDROCHLORIDE 10 MG: 10 TABLET ORAL at 15:23

## 2019-01-14 ASSESSMENT — PAIN DESCRIPTION - PAIN TYPE
TYPE: ACUTE PAIN
TYPE: ACUTE PAIN

## 2019-01-14 ASSESSMENT — PAIN SCALES - WONG BAKER: WONGBAKER_NUMERICALRESPONSE: 4

## 2019-01-14 ASSESSMENT — PAIN DESCRIPTION - ONSET: ONSET: ON-GOING

## 2019-01-14 ASSESSMENT — SLEEP AND FATIGUE QUESTIONNAIRES
AVERAGE NUMBER OF SLEEP HOURS: 8
DO YOU HAVE DIFFICULTY SLEEPING: YES
DO YOU USE A SLEEP AID: NO
DIFFICULTY FALLING ASLEEP: YES
SLEEP PATTERN: RESTLESSNESS
DIFFICULTY ARISING: YES
RESTFUL SLEEP: YES
DIFFICULTY STAYING ASLEEP: YES

## 2019-01-14 ASSESSMENT — PAIN SCALES - GENERAL
PAINLEVEL_OUTOF10: 2
PAINLEVEL_OUTOF10: 5

## 2019-01-14 ASSESSMENT — PAIN DESCRIPTION - DESCRIPTORS
DESCRIPTORS: ACHING
DESCRIPTORS: ACHING

## 2019-01-14 ASSESSMENT — PAIN DESCRIPTION - FREQUENCY: FREQUENCY: INTERMITTENT

## 2019-01-14 ASSESSMENT — PAIN DESCRIPTION - PROGRESSION: CLINICAL_PROGRESSION: NOT CHANGED

## 2019-01-14 ASSESSMENT — LIFESTYLE VARIABLES: HISTORY_ALCOHOL_USE: NO

## 2019-01-14 ASSESSMENT — PATIENT HEALTH QUESTIONNAIRE - PHQ9: SUM OF ALL RESPONSES TO PHQ QUESTIONS 1-9: 23

## 2019-01-15 PROCEDURE — 6370000000 HC RX 637 (ALT 250 FOR IP): Performed by: PSYCHIATRY & NEUROLOGY

## 2019-01-15 PROCEDURE — 1240000000 HC EMOTIONAL WELLNESS R&B

## 2019-01-15 PROCEDURE — 6370000000 HC RX 637 (ALT 250 FOR IP): Performed by: PHYSICIAN ASSISTANT

## 2019-01-15 PROCEDURE — 99232 SBSQ HOSP IP/OBS MODERATE 35: CPT | Performed by: PSYCHIATRY & NEUROLOGY

## 2019-01-15 PROCEDURE — 6360000002 HC RX W HCPCS: Performed by: PSYCHIATRY & NEUROLOGY

## 2019-01-15 RX ORDER — LORAZEPAM 2 MG/ML
1 INJECTION INTRAMUSCULAR ONCE
Status: COMPLETED | OUTPATIENT
Start: 2019-01-15 | End: 2019-01-15

## 2019-01-15 RX ORDER — DIPHENHYDRAMINE HCL 25 MG
25 TABLET ORAL ONCE
Status: DISCONTINUED | OUTPATIENT
Start: 2019-01-15 | End: 2019-01-19

## 2019-01-15 RX ADMIN — PROPRANOLOL HYDROCHLORIDE 10 MG: 10 TABLET ORAL at 15:34

## 2019-01-15 RX ADMIN — PROPRANOLOL HYDROCHLORIDE 10 MG: 10 TABLET ORAL at 21:28

## 2019-01-15 RX ADMIN — LORAZEPAM 1 MG: 2 INJECTION INTRAMUSCULAR; INTRAVENOUS at 14:01

## 2019-01-15 RX ADMIN — HYDROXYZINE PAMOATE 50 MG: 50 CAPSULE ORAL at 08:42

## 2019-01-15 RX ADMIN — PROPRANOLOL HYDROCHLORIDE 10 MG: 10 TABLET ORAL at 08:42

## 2019-01-15 ASSESSMENT — PAIN DESCRIPTION - DESCRIPTORS: DESCRIPTORS: ACHING

## 2019-01-15 ASSESSMENT — PAIN SCALES - GENERAL
PAINLEVEL_OUTOF10: 0
PAINLEVEL_OUTOF10: 4

## 2019-01-15 ASSESSMENT — PAIN DESCRIPTION - ONSET: ONSET: ON-GOING

## 2019-01-15 ASSESSMENT — PAIN DESCRIPTION - LOCATION: LOCATION: OTHER (COMMENT)

## 2019-01-15 ASSESSMENT — PAIN DESCRIPTION - FREQUENCY: FREQUENCY: INTERMITTENT

## 2019-01-15 ASSESSMENT — PAIN DESCRIPTION - PAIN TYPE: TYPE: ACUTE PAIN

## 2019-01-15 ASSESSMENT — PAIN DESCRIPTION - PROGRESSION: CLINICAL_PROGRESSION: NOT CHANGED

## 2019-01-16 PROCEDURE — 99233 SBSQ HOSP IP/OBS HIGH 50: CPT | Performed by: PSYCHIATRY & NEUROLOGY

## 2019-01-16 PROCEDURE — 6370000000 HC RX 637 (ALT 250 FOR IP): Performed by: PHYSICIAN ASSISTANT

## 2019-01-16 PROCEDURE — 1240000000 HC EMOTIONAL WELLNESS R&B

## 2019-01-16 PROCEDURE — 6370000000 HC RX 637 (ALT 250 FOR IP): Performed by: PSYCHIATRY & NEUROLOGY

## 2019-01-16 PROCEDURE — 6360000002 HC RX W HCPCS: Performed by: PSYCHIATRY & NEUROLOGY

## 2019-01-16 PROCEDURE — APPSS30 APP SPLIT SHARED TIME 16-30 MINUTES: Performed by: NURSE PRACTITIONER

## 2019-01-16 RX ORDER — LORAZEPAM 1 MG/1
1 TABLET ORAL NIGHTLY
Status: DISCONTINUED | OUTPATIENT
Start: 2019-01-16 | End: 2019-01-22 | Stop reason: HOSPADM

## 2019-01-16 RX ORDER — CARBOXYMETHYLCELLULOSE SODIUM 5 MG/ML
2 SOLUTION/ DROPS OPHTHALMIC 3 TIMES DAILY
Status: DISCONTINUED | OUTPATIENT
Start: 2019-01-16 | End: 2019-01-16 | Stop reason: ALTCHOICE

## 2019-01-16 RX ORDER — LORAZEPAM 0.5 MG/1
0.5 TABLET ORAL NIGHTLY
Status: DISCONTINUED | OUTPATIENT
Start: 2019-01-16 | End: 2019-01-16

## 2019-01-16 RX ORDER — LORAZEPAM 2 MG/ML
1 INJECTION INTRAMUSCULAR ONCE
Status: COMPLETED | OUTPATIENT
Start: 2019-01-16 | End: 2019-01-16

## 2019-01-16 RX ORDER — PROPRANOLOL HYDROCHLORIDE 20 MG/1
20 TABLET ORAL 3 TIMES DAILY
Status: DISCONTINUED | OUTPATIENT
Start: 2019-01-16 | End: 2019-01-22 | Stop reason: HOSPADM

## 2019-01-16 RX ADMIN — PROPRANOLOL HYDROCHLORIDE 10 MG: 10 TABLET ORAL at 15:03

## 2019-01-16 RX ADMIN — ACETAMINOPHEN 650 MG: 325 TABLET ORAL at 09:13

## 2019-01-16 RX ADMIN — PROPRANOLOL HYDROCHLORIDE 10 MG: 10 TABLET ORAL at 09:06

## 2019-01-16 RX ADMIN — LORAZEPAM 1 MG: 2 INJECTION, SOLUTION INTRAMUSCULAR; INTRAVENOUS at 13:45

## 2019-01-16 RX ADMIN — CARBOXYMETHYLCELLULOSE SODIUM 2 DROP: 10 GEL OPHTHALMIC at 15:02

## 2019-01-16 RX ADMIN — LORAZEPAM 0.5 MG: 0.5 TABLET ORAL at 13:34

## 2019-01-16 RX ADMIN — PROPRANOLOL HYDROCHLORIDE 20 MG: 20 TABLET ORAL at 20:42

## 2019-01-16 RX ADMIN — LORAZEPAM 1 MG: 1 TABLET ORAL at 20:42

## 2019-01-16 RX ADMIN — CARBOXYMETHYLCELLULOSE SODIUM 2 DROP: 10 GEL OPHTHALMIC at 20:42

## 2019-01-16 ASSESSMENT — PAIN DESCRIPTION - LOCATION
LOCATION: BACK
LOCATION: BACK

## 2019-01-16 ASSESSMENT — PAIN DESCRIPTION - FREQUENCY: FREQUENCY: INTERMITTENT

## 2019-01-16 ASSESSMENT — PAIN DESCRIPTION - ONSET: ONSET: ON-GOING

## 2019-01-16 ASSESSMENT — PAIN SCALES - GENERAL
PAINLEVEL_OUTOF10: 2
PAINLEVEL_OUTOF10: 4
PAINLEVEL_OUTOF10: 0

## 2019-01-16 ASSESSMENT — PAIN SCALES - WONG BAKER
WONGBAKER_NUMERICALRESPONSE: 2
WONGBAKER_NUMERICALRESPONSE: 0
WONGBAKER_NUMERICALRESPONSE: 0

## 2019-01-16 ASSESSMENT — PAIN DESCRIPTION - PAIN TYPE: TYPE: CHRONIC PAIN

## 2019-01-16 ASSESSMENT — PAIN DESCRIPTION - DESCRIPTORS: DESCRIPTORS: ACHING

## 2019-01-17 PROCEDURE — 1240000000 HC EMOTIONAL WELLNESS R&B

## 2019-01-17 PROCEDURE — APPSS30 APP SPLIT SHARED TIME 16-30 MINUTES: Performed by: PHYSICIAN ASSISTANT

## 2019-01-17 PROCEDURE — 6360000002 HC RX W HCPCS: Performed by: PHYSICIAN ASSISTANT

## 2019-01-17 PROCEDURE — 6370000000 HC RX 637 (ALT 250 FOR IP): Performed by: PHYSICIAN ASSISTANT

## 2019-01-17 PROCEDURE — 6370000000 HC RX 637 (ALT 250 FOR IP): Performed by: PSYCHIATRY & NEUROLOGY

## 2019-01-17 PROCEDURE — 99232 SBSQ HOSP IP/OBS MODERATE 35: CPT | Performed by: PSYCHIATRY & NEUROLOGY

## 2019-01-17 PROCEDURE — 6360000002 HC RX W HCPCS: Performed by: PSYCHIATRY & NEUROLOGY

## 2019-01-17 RX ORDER — HALOPERIDOL 5 MG/ML
5 INJECTION INTRAMUSCULAR ONCE
Status: COMPLETED | OUTPATIENT
Start: 2019-01-17 | End: 2019-01-17

## 2019-01-17 RX ADMIN — LORAZEPAM 0.5 MG: 2 INJECTION INTRAMUSCULAR; INTRAVENOUS at 18:53

## 2019-01-17 RX ADMIN — LORAZEPAM 1 MG: 1 TABLET ORAL at 20:10

## 2019-01-17 RX ADMIN — LORAZEPAM 0.5 MG: 0.5 TABLET ORAL at 12:22

## 2019-01-17 RX ADMIN — PROPRANOLOL HYDROCHLORIDE 20 MG: 20 TABLET ORAL at 20:10

## 2019-01-17 RX ADMIN — HYDROXYZINE PAMOATE 50 MG: 50 CAPSULE ORAL at 11:05

## 2019-01-17 RX ADMIN — Medication 30 ML: at 15:13

## 2019-01-17 RX ADMIN — HALOPERIDOL LACTATE 5 MG: 5 INJECTION INTRAMUSCULAR at 13:30

## 2019-01-17 RX ADMIN — CARBOXYMETHYLCELLULOSE SODIUM 2 DROP: 10 GEL OPHTHALMIC at 20:09

## 2019-01-17 RX ADMIN — LORAZEPAM 0.5 MG: 2 INJECTION INTRAMUSCULAR; INTRAVENOUS at 13:20

## 2019-01-17 RX ADMIN — PROPRANOLOL HYDROCHLORIDE 20 MG: 20 TABLET ORAL at 15:14

## 2019-01-17 RX ADMIN — PROPRANOLOL HYDROCHLORIDE 20 MG: 20 TABLET ORAL at 08:15

## 2019-01-17 RX ADMIN — CARBOXYMETHYLCELLULOSE SODIUM 2 DROP: 10 GEL OPHTHALMIC at 15:15

## 2019-01-17 RX ADMIN — CARBOXYMETHYLCELLULOSE SODIUM 2 DROP: 10 GEL OPHTHALMIC at 08:31

## 2019-01-17 ASSESSMENT — PAIN SCALES - GENERAL: PAINLEVEL_OUTOF10: 0

## 2019-01-18 PROCEDURE — 99232 SBSQ HOSP IP/OBS MODERATE 35: CPT | Performed by: PSYCHIATRY & NEUROLOGY

## 2019-01-18 PROCEDURE — 90833 PSYTX W PT W E/M 30 MIN: CPT | Performed by: PSYCHIATRY & NEUROLOGY

## 2019-01-18 PROCEDURE — 6360000002 HC RX W HCPCS: Performed by: PSYCHIATRY & NEUROLOGY

## 2019-01-18 PROCEDURE — 6370000000 HC RX 637 (ALT 250 FOR IP): Performed by: PHYSICIAN ASSISTANT

## 2019-01-18 PROCEDURE — 6370000000 HC RX 637 (ALT 250 FOR IP): Performed by: PSYCHIATRY & NEUROLOGY

## 2019-01-18 PROCEDURE — 1240000000 HC EMOTIONAL WELLNESS R&B

## 2019-01-18 RX ORDER — LORAZEPAM 2 MG/ML
2 INJECTION INTRAMUSCULAR EVERY 8 HOURS PRN
Status: DISCONTINUED | OUTPATIENT
Start: 2019-01-18 | End: 2019-01-22 | Stop reason: HOSPADM

## 2019-01-18 RX ORDER — HALOPERIDOL 5 MG/ML
5 INJECTION INTRAMUSCULAR ONCE
Status: COMPLETED | OUTPATIENT
Start: 2019-01-18 | End: 2019-01-18

## 2019-01-18 RX ORDER — HALOPERIDOL 5 MG/ML
5 INJECTION INTRAMUSCULAR EVERY 8 HOURS PRN
Status: DISCONTINUED | OUTPATIENT
Start: 2019-01-18 | End: 2019-01-22 | Stop reason: HOSPADM

## 2019-01-18 RX ORDER — LORAZEPAM 2 MG/ML
2 INJECTION INTRAMUSCULAR ONCE
Status: DISCONTINUED | OUTPATIENT
Start: 2019-01-18 | End: 2019-01-22 | Stop reason: HOSPADM

## 2019-01-18 RX ADMIN — LORAZEPAM 2 MG: 2 INJECTION INTRAMUSCULAR; INTRAVENOUS at 10:52

## 2019-01-18 RX ADMIN — ACETAMINOPHEN 650 MG: 325 TABLET ORAL at 12:31

## 2019-01-18 RX ADMIN — PROPRANOLOL HYDROCHLORIDE 20 MG: 20 TABLET ORAL at 09:56

## 2019-01-18 RX ADMIN — LORAZEPAM 0.5 MG: 0.5 TABLET ORAL at 09:56

## 2019-01-18 RX ADMIN — Medication 30 ML: at 09:56

## 2019-01-18 RX ADMIN — CARBOXYMETHYLCELLULOSE SODIUM 2 DROP: 10 GEL OPHTHALMIC at 13:22

## 2019-01-18 RX ADMIN — CARBOXYMETHYLCELLULOSE SODIUM 2 DROP: 10 GEL OPHTHALMIC at 23:21

## 2019-01-18 RX ADMIN — HALOPERIDOL LACTATE 5 MG: 5 INJECTION INTRAMUSCULAR at 10:42

## 2019-01-18 RX ADMIN — PROPRANOLOL HYDROCHLORIDE 20 MG: 20 TABLET ORAL at 13:23

## 2019-01-18 RX ADMIN — LORAZEPAM 1 MG: 1 TABLET ORAL at 23:20

## 2019-01-18 RX ADMIN — CARBOXYMETHYLCELLULOSE SODIUM 2 DROP: 10 GEL OPHTHALMIC at 09:56

## 2019-01-18 ASSESSMENT — PAIN SCALES - GENERAL
PAINLEVEL_OUTOF10: 0
PAINLEVEL_OUTOF10: 3
PAINLEVEL_OUTOF10: 0
PAINLEVEL_OUTOF10: 0

## 2019-01-19 PROCEDURE — 6370000000 HC RX 637 (ALT 250 FOR IP): Performed by: PSYCHIATRY & NEUROLOGY

## 2019-01-19 PROCEDURE — 6360000002 HC RX W HCPCS: Performed by: PSYCHIATRY & NEUROLOGY

## 2019-01-19 PROCEDURE — 99232 SBSQ HOSP IP/OBS MODERATE 35: CPT | Performed by: PSYCHIATRY & NEUROLOGY

## 2019-01-19 PROCEDURE — 6370000000 HC RX 637 (ALT 250 FOR IP): Performed by: PHYSICIAN ASSISTANT

## 2019-01-19 PROCEDURE — 1240000000 HC EMOTIONAL WELLNESS R&B

## 2019-01-19 PROCEDURE — 6370000000 HC RX 637 (ALT 250 FOR IP): Performed by: NURSE PRACTITIONER

## 2019-01-19 RX ORDER — BENZTROPINE MESYLATE 1 MG/1
1 TABLET ORAL 2 TIMES DAILY
Status: DISCONTINUED | OUTPATIENT
Start: 2019-01-19 | End: 2019-01-22 | Stop reason: HOSPADM

## 2019-01-19 RX ORDER — CITALOPRAM 20 MG/1
20 TABLET ORAL DAILY
Status: DISCONTINUED | OUTPATIENT
Start: 2019-01-19 | End: 2019-01-19

## 2019-01-19 RX ORDER — BUPROPION HYDROCHLORIDE 150 MG/1
150 TABLET ORAL DAILY
Status: DISCONTINUED | OUTPATIENT
Start: 2019-01-19 | End: 2019-01-20

## 2019-01-19 RX ORDER — BENZTROPINE MESYLATE 1 MG/ML
2 INJECTION INTRAMUSCULAR; INTRAVENOUS ONCE
Status: COMPLETED | OUTPATIENT
Start: 2019-01-19 | End: 2019-01-19

## 2019-01-19 RX ADMIN — LORAZEPAM 1 MG: 1 TABLET ORAL at 21:33

## 2019-01-19 RX ADMIN — CARBOXYMETHYLCELLULOSE SODIUM 2 DROP: 10 GEL OPHTHALMIC at 09:16

## 2019-01-19 RX ADMIN — PROPRANOLOL HYDROCHLORIDE 20 MG: 20 TABLET ORAL at 09:16

## 2019-01-19 RX ADMIN — LORAZEPAM 0.5 MG: 0.5 TABLET ORAL at 12:03

## 2019-01-19 RX ADMIN — BENZTROPINE MESYLATE 1 MG: 1 TABLET ORAL at 21:32

## 2019-01-19 RX ADMIN — Medication 30 ML: at 09:16

## 2019-01-19 RX ADMIN — BUPROPION HYDROCHLORIDE 150 MG: 150 TABLET, EXTENDED RELEASE ORAL at 11:10

## 2019-01-19 RX ADMIN — BENZTROPINE MESYLATE 2 MG: 1 INJECTION INTRAMUSCULAR; INTRAVENOUS at 18:22

## 2019-01-19 RX ADMIN — CARBOXYMETHYLCELLULOSE SODIUM 2 DROP: 10 GEL OPHTHALMIC at 21:33

## 2019-01-19 RX ADMIN — CARBOXYMETHYLCELLULOSE SODIUM 2 DROP: 10 GEL OPHTHALMIC at 14:40

## 2019-01-19 ASSESSMENT — PAIN SCALES - GENERAL
PAINLEVEL_OUTOF10: 0
PAINLEVEL_OUTOF10: 0

## 2019-01-20 PROCEDURE — 1240000000 HC EMOTIONAL WELLNESS R&B

## 2019-01-20 PROCEDURE — 6370000000 HC RX 637 (ALT 250 FOR IP): Performed by: PSYCHIATRY & NEUROLOGY

## 2019-01-20 PROCEDURE — 99231 SBSQ HOSP IP/OBS SF/LOW 25: CPT | Performed by: NURSE PRACTITIONER

## 2019-01-20 PROCEDURE — 99232 SBSQ HOSP IP/OBS MODERATE 35: CPT | Performed by: PSYCHIATRY & NEUROLOGY

## 2019-01-20 RX ADMIN — ACETAMINOPHEN 650 MG: 325 TABLET ORAL at 19:40

## 2019-01-20 RX ADMIN — PROPRANOLOL HYDROCHLORIDE 20 MG: 20 TABLET ORAL at 20:44

## 2019-01-20 RX ADMIN — Medication 30 ML: at 08:37

## 2019-01-20 RX ADMIN — BENZTROPINE MESYLATE 1 MG: 1 TABLET ORAL at 08:37

## 2019-01-20 RX ADMIN — LORAZEPAM 1 MG: 1 TABLET ORAL at 20:59

## 2019-01-20 RX ADMIN — CARBOXYMETHYLCELLULOSE SODIUM 2 DROP: 10 GEL OPHTHALMIC at 20:44

## 2019-01-20 RX ADMIN — PROPRANOLOL HYDROCHLORIDE 20 MG: 20 TABLET ORAL at 08:37

## 2019-01-20 RX ADMIN — BENZTROPINE MESYLATE 1 MG: 1 TABLET ORAL at 20:44

## 2019-01-20 ASSESSMENT — PAIN DESCRIPTION - DESCRIPTORS: DESCRIPTORS: ACHING

## 2019-01-20 ASSESSMENT — PAIN - FUNCTIONAL ASSESSMENT: PAIN_FUNCTIONAL_ASSESSMENT: ACTIVITIES ARE NOT PREVENTED

## 2019-01-20 ASSESSMENT — PAIN DESCRIPTION - ORIENTATION: ORIENTATION: RIGHT

## 2019-01-20 ASSESSMENT — PAIN SCALES - GENERAL
PAINLEVEL_OUTOF10: 0
PAINLEVEL_OUTOF10: 6
PAINLEVEL_OUTOF10: 0

## 2019-01-20 ASSESSMENT — PAIN DESCRIPTION - LOCATION: LOCATION: RIB CAGE

## 2019-01-20 ASSESSMENT — PAIN DESCRIPTION - FREQUENCY: FREQUENCY: INTERMITTENT

## 2019-01-20 ASSESSMENT — PAIN DESCRIPTION - PROGRESSION: CLINICAL_PROGRESSION: GRADUALLY IMPROVING

## 2019-01-20 ASSESSMENT — PAIN DESCRIPTION - DIRECTION: RADIATING_TOWARDS: RIGHT SIDE

## 2019-01-20 ASSESSMENT — PAIN DESCRIPTION - PAIN TYPE: TYPE: ACUTE PAIN

## 2019-01-21 PROCEDURE — 6370000000 HC RX 637 (ALT 250 FOR IP): Performed by: PSYCHIATRY & NEUROLOGY

## 2019-01-21 PROCEDURE — 99231 SBSQ HOSP IP/OBS SF/LOW 25: CPT | Performed by: PSYCHIATRY & NEUROLOGY

## 2019-01-21 PROCEDURE — 90833 PSYTX W PT W E/M 30 MIN: CPT | Performed by: PSYCHIATRY & NEUROLOGY

## 2019-01-21 PROCEDURE — 1240000000 HC EMOTIONAL WELLNESS R&B

## 2019-01-21 RX ADMIN — BENZTROPINE MESYLATE 1 MG: 1 TABLET ORAL at 21:21

## 2019-01-21 RX ADMIN — CARBOXYMETHYLCELLULOSE SODIUM 2 DROP: 10 GEL OPHTHALMIC at 08:03

## 2019-01-21 RX ADMIN — LORAZEPAM 1 MG: 1 TABLET ORAL at 21:21

## 2019-01-21 RX ADMIN — PROPRANOLOL HYDROCHLORIDE 20 MG: 20 TABLET ORAL at 21:21

## 2019-01-21 RX ADMIN — Medication 30 ML: at 08:03

## 2019-01-21 RX ADMIN — BENZTROPINE MESYLATE 1 MG: 1 TABLET ORAL at 08:03

## 2019-01-21 ASSESSMENT — PAIN SCALES - GENERAL
PAINLEVEL_OUTOF10: 0
PAINLEVEL_OUTOF10: 0

## 2019-01-22 VITALS
DIASTOLIC BLOOD PRESSURE: 75 MMHG | RESPIRATION RATE: 16 BRPM | HEART RATE: 72 BPM | BODY MASS INDEX: 22.49 KG/M2 | OXYGEN SATURATION: 99 % | WEIGHT: 135 LBS | TEMPERATURE: 97.7 F | SYSTOLIC BLOOD PRESSURE: 112 MMHG | HEIGHT: 65 IN

## 2019-01-22 PROCEDURE — 99239 HOSP IP/OBS DSCHRG MGMT >30: CPT | Performed by: PSYCHIATRY & NEUROLOGY

## 2019-01-22 PROCEDURE — 6370000000 HC RX 637 (ALT 250 FOR IP): Performed by: PSYCHIATRY & NEUROLOGY

## 2019-01-22 PROCEDURE — 5130000000 HC BRIDGE APPOINTMENT

## 2019-01-22 RX ORDER — PROPRANOLOL HYDROCHLORIDE 20 MG/1
20 TABLET ORAL 3 TIMES DAILY
Qty: 90 TABLET | Refills: 0 | Status: SHIPPED | OUTPATIENT
Start: 2019-01-22

## 2019-01-22 RX ORDER — BENZTROPINE MESYLATE 0.5 MG/1
1 TABLET ORAL 2 TIMES DAILY
Qty: 60 TABLET | Refills: 0 | Status: SHIPPED | OUTPATIENT
Start: 2019-01-22

## 2019-01-22 RX ADMIN — CARBOXYMETHYLCELLULOSE SODIUM 2 DROP: 10 GEL OPHTHALMIC at 08:20

## 2019-01-22 RX ADMIN — Medication 30 ML: at 08:20

## 2019-01-22 RX ADMIN — BENZTROPINE MESYLATE 1 MG: 1 TABLET ORAL at 08:20

## 2019-01-22 RX ADMIN — PROPRANOLOL HYDROCHLORIDE 20 MG: 20 TABLET ORAL at 08:20

## 2019-01-22 ASSESSMENT — PAIN SCALES - GENERAL: PAINLEVEL_OUTOF10: 0

## 2019-01-23 ENCOUNTER — TELEPHONE (OUTPATIENT)
Dept: ADMINISTRATIVE | Age: 19
End: 2019-01-23

## 2019-05-07 ENCOUNTER — NURSE TRIAGE (OUTPATIENT)
Dept: ADMINISTRATIVE | Age: 19
End: 2019-05-07

## 2019-05-07 NOTE — TELEPHONE ENCOUNTER
Summary: returning call    Returning call, concerning restroom complications.  Wants to know what he can take to eliminate the issue or who and where could he go?

## 2019-05-08 ENCOUNTER — NURSE TRIAGE (OUTPATIENT)
Dept: ADMINISTRATIVE | Age: 19
End: 2019-05-08

## 2019-05-08 NOTE — TELEPHONE ENCOUNTER
Summary: GI Issues    Pt calling- pt is having some GI issues and would like to speak with a nurse about them. Caller states he has had chronic diarrhea and constipation for a while. He is seeing his PCP for this and has had several test done. He was advice by his PCP to take over-the-counter- Imodium for the diarrhea. He has appointment with a GI specialist in one week. He is concern about the week prior to his GI appointment as to what he should do. He wonderd if he could take something stronger then the Imodium. He states he has had no stool today, no diarrhea, no abdominal pain, no fever, or vomiting. I advice Artie Buck to follow his PCP recommendations. If he has severe diarrhea with signs of dehydration or bloody diarrhea or abdomial pain then go to the emergency room for evaluation. Advice to keep his GI appointment in one week.

## 2019-05-08 NOTE — TELEPHONE ENCOUNTER
Reason for Disposition   Diarrhea is a chronic symptom (recurrent or ongoing AND present > 4 weeks)    Answer Assessment - Initial Assessment Questions  1. DIARRHEA SEVERITY: \"How bad is the diarrhea? \" \"How many extra stools have you had in the past 24 hours than normal?\"     - MILD: Few loose or mushy BMs; increase of 1-3 stools over normal daily number of stools; mild increase in ostomy output. - MODERATE: Increase of 4-6 stools daily over normal; moderate increase in ostomy output.    - SEVERE (or Worst Possible): Increase of 7 or more stools daily over normal; moderate increase in ostomy output; incontinence. Geovanna Ray states he had had no bowel movement of any kind today. He is concern about what will happen for the one week while he is waiting for his GI appointment. 2. ONSET: \"When did the diarrhea begin? \"       Chronic episodes of diarrhea and constipation. He is seeing his PCP, who recommend over the counter Imodium. He has a appointment with a GI specialist in one we. 3. BM CONSISTENCY: \"How loose or watery is the diarrhea? \"       No stools today. 4. Any vomiting in the last 24 hours? \"      No vomiting. 5. ABDOMINAL PAIN: Linda Needle you having any abdominal pain? \" If yes: \"What does it feel like? \" (e.g., crampy, dull, intermittent, constant)       Denies any abdominal pain. 6. ABDOMINAL PAIN SEVERITY: If present, ask: \"How bad is the pain? \"  (e.g., Scale 1-10; mild, moderate, or severe)     - MILD (1-3): doesn't interfere with normal activities, abdomen soft and not tender to touch      - MODERATE (4-7): interferes with normal activities or awakens from sleep, tender to touch      - SEVERE (8-10): excruciating pain, doubled over, unable to do any normal activities        Denies  Abdominal pain   7. ORAL INTAKE: If vomiting, \"Have you been able to drink liquids? \" \"How much fluids have you had in the past 24 hours? \"      Water. 8. HYDRATION: \"Any signs of dehydration? \" (e.g., dry mouth [not just dry lips], too weak to stand, dizziness, new weight loss) \"When did you last urinate? \"      Normal hydration. Advised if any signs of dehydration this week, go to the emergency room for evaluation. 9. EXPOSURE: \"Have you traveled to a foreign country recently? \" \"Have you been exposed to anyone with diarrhea? \" \"Could you have eaten any food that was spoiled? \"       No.   10. ANTIBIOTIC USE: \"Are you taking antibiotics now or have you taken antibiotics in the past 2 months? \"        No   11. OTHER SYMPTOMS: \"Do you have any other symptoms? \" (e.g., fever, blood in stool)        Denies fever or bloody stool. 12. PREGNANCY: \"Is there any chance you are pregnant? \" \"When was your last menstrual period? \"       Male    Protocols used: [id number]-CD

## 2019-08-26 ENCOUNTER — NURSE TRIAGE (OUTPATIENT)
Dept: OTHER | Facility: CLINIC | Age: 19
End: 2019-08-26

## 2019-09-14 ENCOUNTER — NURSE TRIAGE (OUTPATIENT)
Dept: OTHER | Facility: CLINIC | Age: 19
End: 2019-09-14

## 2019-10-02 ENCOUNTER — NURSE TRIAGE (OUTPATIENT)
Dept: OTHER | Facility: CLINIC | Age: 19
End: 2019-10-02

## 2019-11-23 ENCOUNTER — NURSE TRIAGE (OUTPATIENT)
Dept: OTHER | Facility: CLINIC | Age: 19
End: 2019-11-23

## 2019-11-24 ENCOUNTER — NURSE TRIAGE (OUTPATIENT)
Dept: OTHER | Facility: CLINIC | Age: 19
End: 2019-11-24

## 2020-01-11 ENCOUNTER — NURSE TRIAGE (OUTPATIENT)
Dept: OTHER | Facility: CLINIC | Age: 20
End: 2020-01-11

## 2020-01-11 NOTE — TELEPHONE ENCOUNTER
Reason for Disposition   [1] Headache AND [2] has not taken pain medications    Protocols used: HEADACHE-ADULT-AH    Pt calling for JT benefit. States he has a HA that he rates as 3/10. Pt denies diarrhea, states last normal BM was just prior to triage call. No nausea/ vomiting. Pt has not taken any medications at this time, but does have OTC meds that he is going to take. Pt is very talkative. States he thought maybe HA was due to sleeping on 2 pillows. He felt better after he got up and had a BM. He states he prayed to God and He told him to call the nurse line. He states that [employer] needed to come out, did. \" in reference to his BM. Triage indicates for pt to take care of symptoms at home. Pt instructed on use of OTC medications Pt instructed to call back for any new or worsening symptoms. Patient verbalized understanding. Patient denies any other questions or concerns. Please do not respond to the triage nurse through this encounter. Any subsequent communication should be directly with the patient.

## 2020-04-10 ENCOUNTER — NURSE TRIAGE (OUTPATIENT)
Dept: OTHER | Facility: CLINIC | Age: 20
End: 2020-04-10

## 2020-04-10 NOTE — TELEPHONE ENCOUNTER
Patient called in via the [address] [place] [address] to report symptoms of anxiety. States taking PRN medication which is lasting as long as it use to. Patient informed of disposition. Care advice as documented. Instructed patient to call back with worsening symptoms. Patient verbalized understanding and denies any further questions/concerns. This triage is a result of a call to Angela mccarthy Nurse. Please do not respond to the triager through this encounter. Any subsequent communication should directly to the patient.       Reason for Disposition   Symptoms interfere with work or school    Protocols used: ANXIETY AND PANIC ATTACK-ADULT-[place]

## 2021-08-21 ENCOUNTER — NURSE TRIAGE (OUTPATIENT)
Dept: OTHER | Facility: CLINIC | Age: 21
End: 2021-08-21

## 2021-08-21 NOTE — TELEPHONE ENCOUNTER
Reason for Disposition   [1] MILD or MODERATE vomiting AND [2] present > 48 hours (2 days) (Exception: mild vomiting with associated diarrhea)    Answer Assessment - Initial Assessment Questions  1. VOMITING SEVERITY: \"How many times have you vomited in the past 24 hours? \"      - MILD:  1 - 2 times/day     - MODERATE: 3 - 5 times/day, decreased oral intake without significant weight loss or symptoms of dehydration     - SEVERE: 6 or more times/day, vomits everything or nearly everything, with significant weight loss, symptoms of dehydration       Severe    2. ONSET: \"When did the vomiting begin? \"       A day and 1/2 ago    3. FLUIDS: \"What fluids or food have you vomited up today? \" \"Have you been able to keep any fluids down? \"      No solids- drinking water, he thinks maybe that is not coming up    4. ABDOMINAL PAIN: Merla Soho your having any abdominal pain? \" If yes : \"How bad is it and what does it feel like? \" (e.g., crampy, dull, intermittent, constant)       No cramps. nauseous feeling    5. DIARRHEA: \"Is there any diarrhea? \" If so, ask: \"How many times today? \"       None - increase in bm    6. CONTACTS: \"Is there anyone else in the family with the same symptoms? \"       No    7. CAUSE: \"What do you think is causing your vomiting? \"      A few bites at [employer]- maybe that? Food poising. 8. HYDRATION STATUS: \"Any signs of dehydration? \" (e.g., dry mouth [not only dry lips], too weak to stand) \"When did you last urinate? \"      Yes that is going well    9. OTHER SYMPTOMS: \"Do you have any other symptoms? \" (e.g., fever, headache, vertigo, vomiting blood or coffee grounds, recent head injury)     A little dizziness    10. PREGNANCY: \"Is there any chance you are pregnant? \" \"When was your last menstrual period? \"        n/a    Protocols used: VOMITING-ADULT-    Caller is vomiting for the past day 1/2. Caller is not able to keep food down, but is able to keep some water down.  Caller denies any fever or any other s/s. Recommendation See PCP within 24 hours. [facility]. Care advice provided, patient verbalizes understanding; denies any other questions or concerns; instructed to call back for any new or worsening symptoms.

## 2022-02-12 ENCOUNTER — NURSE TRIAGE (OUTPATIENT)
Dept: OTHER | Facility: CLINIC | Age: 22
End: 2022-02-12

## 2022-02-12 NOTE — TELEPHONE ENCOUNTER
Subjective: Caller states \"I'm feeling a lot of fear right now\"     Current Symptoms: chest tightness, pressure, anxiety, cough, runny nose    Onset: past couple hours    Associated Symptoms: anxiety    Pain Severity: unable to rate, \"it's a mixture of tension and pressure and a burning sensation\"/10; burning; waxing and waning    Temperature: no thermometer, possibly feeling chills     What has been tried: coricidin, jovita, tea    Recommended disposition:  Go to ED. Due to severe anxiety persisting through duration of phone conversation. Verbal reassurance given. Encouraged to take prescribed PRN anxiety medications    Care advice provided, patient verbalizes understanding; denies any other questions or concerns; instructed to call back for any new or worsening symptoms. unclear, pt encouraged to go to ED     This triage is a result of a call to Angela a Nurse. Please do not respond to the triage nurse through this encounter. Any subsequent communication should be directly with the patient.     Reason for Disposition   [1] Symptoms of anxiety or panic AND [2] has not been evaluated for this by physician   Patient sounds very sick or weak to the triager    Protocols used: ANXIETY AND PANIC ATTACK-ADULT-AH, CHEST PAIN-ADULT-AH

## 2022-07-21 ENCOUNTER — NURSE TRIAGE (OUTPATIENT)
Dept: OTHER | Facility: CLINIC | Age: 22
End: 2022-07-21

## 2022-07-21 NOTE — TELEPHONE ENCOUNTER
Subjective: Caller states \"My R ear is hurting really badly\"     Current Symptoms: R ear pain    Onset: Yesterday     Associated Symptoms: increased wakefulness    Pain Severity: 5/10; sharp, tender; constant    Temperature: Denies fever or chills     What has been tried: Nothing    Recommended disposition: Go to ED Now    Care advice provided, patient verbalizes understanding; denies any other questions or concerns; instructed to call back for any new or worsening symptoms. Patient/caller agrees to proceed to local Emergency Department    This triage is a result of a call to Toyus mccarthy Nurse. Please do not respond to the triage nurse through this encounter. Any subsequent communication should be directly with the patient.     Reason for Disposition   [1] Bony area of skull behind the ear is pink or swollen AND [2] fever    Protocols used: Earache-ADULT-

## 2022-08-13 ENCOUNTER — NURSE TRIAGE (OUTPATIENT)
Dept: OTHER | Facility: CLINIC | Age: 22
End: 2022-08-13

## 2022-08-13 NOTE — TELEPHONE ENCOUNTER
Subjective: Caller states \"I'm having heartburn and I don't know what to do\"     Current Symptoms: \"Heart burn\"  Worse laying down   Denies pain radiation   Nausea          Had similar episode 3 months episode     Onset: 4 hours ago; intermittent    Associated Symptoms: NA    Pain Severity: 6/10; intermittent    Temperature: . What has been tried:   Omeprazole (had taken a 1/2 tablet, advised caller he can take the other 1/2 to get the full prescribed dose)        Recommended disposition: See PCP within 24 Hours    Patient is very anxious and advised him that if he feels like he cannot wait to be seen during the day to go to the ER. Care advice provided, patient verbalizes understanding; denies any other questions or concerns; instructed to call back for any new or worsening symptoms. Patient/caller agrees to follow-up with PCP   This triage is a result of a call to Angela mccarthy Nurse. Please do not respond to the triage nurse through this encounter. Any subsequent communication should be directly with the patient.     Reason for Disposition   [1] Patient says chest pain feels exactly the same as previously diagnosed \"heartburn\" AND [2] describes burning in chest AND [3] accompanying sour taste in mouth    Protocols used: Chest Pain-ADULT-

## 2023-03-09 ENCOUNTER — NURSE TRIAGE (OUTPATIENT)
Dept: OTHER | Facility: CLINIC | Age: 23
End: 2023-03-09

## 2023-03-10 NOTE — TELEPHONE ENCOUNTER
Location of patient: [place]    Subjective: Caller states \"nauseous, dizzy, woozy\"     Current Symptoms: see above, dry eyes also    Onset: 2 hours ago; sudden    Associated Symptoms: reduced appetite    Pain Severity: 0/10; N/A; none    Temperature: feels warmby parent's tactile estimate    What has been tried: nothing    LMP: NA Pregnant: NA    Recommended disposition: Go to ED Now    Care advice provided, patient verbalizes understanding; denies any other questions or concerns; instructed to call back for any new or worsening symptoms. Unsure, patient became loud, yelling and cursing. This triage [facility]. Please do not respond to the triage nurse through this encounter. Any subsequent communication should be directly with the patient.     Reason for Disposition   Unable to walk, or can only walk with assistance (e.g., requires support)    Protocols used: Nausea-ADULT-AH

## 2024-01-23 NOTE — TELEPHONE ENCOUNTER
Vistaril    Reason for Disposition   Patient sounds very upset or troubled to the triager    Answer Assessment - Initial Assessment Questions  1. CONCERN: \"What happened that made you call today? \"     Just feeling really anxious today. 2. ANXIETY SYMPTOM SCREENING: \"Can you describe how you have been feeling? \"  (e.g., tense, restless, panicky, anxious, keyed up, trouble sleeping, trouble concentrating)     Feels paranoid and feels like his heart is beating fast.  3. ONSET: \"How long have you been feeling this way? \"    Anxiety started to get bad today, woke up with it. Got a shot recently for Bipolar. 4. RECURRENT: \"Have you felt this way before? \"  If yes: \"What happened that time? \" \"What helped these feelings go away in the past?\"      Will have panic attacks but the Vistaril usually helps  5. RISK OF HARM - SUICIDAL IDEATION:  \"Do you ever have thoughts of hurting or killing yourself? \"  (e.g., yes, no, no but preoccupation with thoughts about death)    - INTENT:  \"Do you have thoughts of hurting or killing yourself right NOW? \" (e.g., yes, no, N/A)    - PLAN: \"Do you have a specific plan for how you would do this? \" (e.g., gun, knife, overdose, no plan, N/A)   no risk to harm himself  6. RISK OF HARM - HOMICIDAL IDEATION:  \"Do you ever have thoughts of hurting or killing someone else? \"  (e.g., yes, no, no but preoccupation with thoughts about death)    - INTENT:  \"Do you have thoughts of hurting or killing someone right NOW? \" (e.g., yes, no, N/A)    - PLAN: \"Do you have a specific plan for how you would do this? \" (e.g., gun, knife, no plan, N/A)      No risk  7. FUNCTIONAL IMPAIRMENT: \"How have things been going for you overall in your life? Have you had any more difficulties than usual doing your normal daily activities? \"  (e.g., better, same, worse; self-care, school, work, interactions)       8. SUPPORT: \"Who is with you now? \" \"Who do you live with?\" \"Do you have family or friends nearby who you can talk to?\"
Statement Selected

## 2024-02-06 ENCOUNTER — HOSPITAL ENCOUNTER (INPATIENT)
Age: 24
LOS: 6 days | Discharge: HOME | DRG: 885 | End: 2024-02-12
Attending: EMERGENCY MEDICINE | Admitting: PSYCHIATRY & NEUROLOGY
Payer: MEDICARE

## 2024-02-06 DIAGNOSIS — F30.9 MANIA (HCC): ICD-10-CM

## 2024-02-06 DIAGNOSIS — R45.851 SUICIDAL IDEATIONS: Primary | ICD-10-CM

## 2024-02-06 PROBLEM — F31.9 BIPOLAR DISORDER WITH PSYCHOTIC FEATURES (HCC): Status: ACTIVE | Noted: 2024-02-06

## 2024-02-06 LAB
ALBUMIN SERPL BCG-MCNC: 4.9 G/DL (ref 3.5–5.1)
ALP SERPL-CCNC: 82 U/L (ref 38–126)
ALT SERPL W/O P-5'-P-CCNC: 16 U/L (ref 11–66)
AMPHETAMINES UR QL SCN: NEGATIVE
ANION GAP SERPL CALC-SCNC: 12 MEQ/L (ref 8–16)
APAP SERPL-MCNC: < 5 UG/ML (ref 0–20)
AST SERPL-CCNC: 17 U/L (ref 5–40)
BARBITURATES UR QL SCN: NEGATIVE
BASOPHILS ABSOLUTE: 0 THOU/MM3 (ref 0–0.1)
BASOPHILS NFR BLD AUTO: 0.4 %
BENZODIAZ UR QL SCN: NEGATIVE
BILIRUB CONJ SERPL-MCNC: 0.3 MG/DL (ref 0–0.3)
BILIRUB SERPL-MCNC: 3.1 MG/DL (ref 0.3–1.2)
BILIRUB UR QL STRIP.AUTO: NEGATIVE
BUN SERPL-MCNC: 12 MG/DL (ref 7–22)
BZE UR QL SCN: NEGATIVE
CALCIUM SERPL-MCNC: 9.2 MG/DL (ref 8.5–10.5)
CANNABINOIDS UR QL SCN: NEGATIVE
CHARACTER UR: CLEAR
CHLORIDE SERPL-SCNC: 103 MEQ/L (ref 98–111)
CO2 SERPL-SCNC: 24 MEQ/L (ref 23–33)
COLOR: YELLOW
CREAT SERPL-MCNC: 0.7 MG/DL (ref 0.4–1.2)
DEPRECATED RDW RBC AUTO: 40 FL (ref 35–45)
EKG ATRIAL RATE: 81 BPM
EKG P AXIS: 68 DEGREES
EKG P-R INTERVAL: 162 MS
EKG Q-T INTERVAL: 368 MS
EKG QRS DURATION: 92 MS
EKG QTC CALCULATION (BAZETT): 427 MS
EKG R AXIS: 80 DEGREES
EKG T AXIS: 42 DEGREES
EKG VENTRICULAR RATE: 81 BPM
EOSINOPHIL NFR BLD AUTO: 1.5 %
EOSINOPHILS ABSOLUTE: 0.1 THOU/MM3 (ref 0–0.4)
ERYTHROCYTE [DISTWIDTH] IN BLOOD BY AUTOMATED COUNT: 12.8 % (ref 11.5–14.5)
ETHANOL SERPL-MCNC: < 0.01 %
FENTANYL: NEGATIVE
FLUAV RNA RESP QL NAA+PROBE: NOT DETECTED
FLUBV RNA RESP QL NAA+PROBE: NOT DETECTED
GFR SERPL CREATININE-BSD FRML MDRD: > 60 ML/MIN/1.73M2
GLUCOSE SERPL-MCNC: 103 MG/DL (ref 70–108)
GLUCOSE UR QL STRIP.AUTO: NEGATIVE MG/DL
HCT VFR BLD AUTO: 47.9 % (ref 42–52)
HGB BLD-MCNC: 16.6 GM/DL (ref 14–18)
HGB UR QL STRIP.AUTO: NEGATIVE
IMM GRANULOCYTES # BLD AUTO: 0.01 THOU/MM3 (ref 0–0.07)
IMM GRANULOCYTES NFR BLD AUTO: 0.1 %
KETONES UR QL STRIP.AUTO: ABNORMAL
LYMPHOCYTES ABSOLUTE: 2.6 THOU/MM3 (ref 1–4.8)
LYMPHOCYTES NFR BLD AUTO: 38.5 %
MAGNESIUM SERPL-MCNC: 2.1 MG/DL (ref 1.6–2.4)
MCH RBC QN AUTO: 29.9 PG (ref 26–33)
MCHC RBC AUTO-ENTMCNC: 34.7 GM/DL (ref 32.2–35.5)
MCV RBC AUTO: 86.3 FL (ref 80–94)
MONOCYTES ABSOLUTE: 0.4 THOU/MM3 (ref 0.4–1.3)
MONOCYTES NFR BLD AUTO: 6.1 %
NEUTROPHILS NFR BLD AUTO: 53.4 %
NITRITE UR QL STRIP: NEGATIVE
NRBC BLD AUTO-RTO: 0 /100 WBC
OPIATES UR QL SCN: NEGATIVE
OSMOLALITY SERPL CALC.SUM OF ELEC: 277.5 MOSMOL/KG (ref 275–300)
OXYCODONE: NEGATIVE
PCP UR QL SCN: NEGATIVE
PH UR STRIP.AUTO: 6.5 [PH] (ref 5–9)
PLATELET # BLD AUTO: 294 THOU/MM3 (ref 130–400)
PMV BLD AUTO: 9.3 FL (ref 9.4–12.4)
POTASSIUM SERPL-SCNC: 3.9 MEQ/L (ref 3.5–5.2)
PROT SERPL-MCNC: 7.5 G/DL (ref 6.1–8)
PROT UR STRIP.AUTO-MCNC: NEGATIVE MG/DL
RBC # BLD AUTO: 5.55 MILL/MM3 (ref 4.7–6.1)
SALICYLATES SERPL-MCNC: < 0.3 MG/DL (ref 2–10)
SARS-COV-2 RNA RESP QL NAA+PROBE: NOT DETECTED
SEGMENTED NEUTROPHILS ABSOLUTE COUNT: 3.6 THOU/MM3 (ref 1.8–7.7)
SODIUM SERPL-SCNC: 139 MEQ/L (ref 135–145)
SP GR UR REFRACT.AUTO: 1.03 (ref 1–1.03)
UROBILINOGEN, URINE: 1 EU/DL (ref 0–1)
WBC # BLD AUTO: 6.7 THOU/MM3 (ref 4.8–10.8)
WBC #/AREA URNS HPF: NEGATIVE /[HPF]

## 2024-02-06 PROCEDURE — 80179 DRUG ASSAY SALICYLATE: CPT

## 2024-02-06 PROCEDURE — 85025 COMPLETE CBC W/AUTO DIFF WBC: CPT

## 2024-02-06 PROCEDURE — 1240000000 HC EMOTIONAL WELLNESS R&B

## 2024-02-06 PROCEDURE — 93010 ELECTROCARDIOGRAM REPORT: CPT | Performed by: INTERNAL MEDICINE

## 2024-02-06 PROCEDURE — 6370000000 HC RX 637 (ALT 250 FOR IP): Performed by: PSYCHIATRY & NEUROLOGY

## 2024-02-06 PROCEDURE — 83735 ASSAY OF MAGNESIUM: CPT

## 2024-02-06 PROCEDURE — 93005 ELECTROCARDIOGRAM TRACING: CPT | Performed by: STUDENT IN AN ORGANIZED HEALTH CARE EDUCATION/TRAINING PROGRAM

## 2024-02-06 PROCEDURE — 82077 ASSAY SPEC XCP UR&BREATH IA: CPT

## 2024-02-06 PROCEDURE — 80307 DRUG TEST PRSMV CHEM ANLYZR: CPT

## 2024-02-06 PROCEDURE — 99285 EMERGENCY DEPT VISIT HI MDM: CPT

## 2024-02-06 PROCEDURE — 80076 HEPATIC FUNCTION PANEL: CPT

## 2024-02-06 PROCEDURE — 87636 SARSCOV2 & INF A&B AMP PRB: CPT

## 2024-02-06 PROCEDURE — 80048 BASIC METABOLIC PNL TOTAL CA: CPT

## 2024-02-06 PROCEDURE — 81003 URINALYSIS AUTO W/O SCOPE: CPT

## 2024-02-06 PROCEDURE — 36415 COLL VENOUS BLD VENIPUNCTURE: CPT

## 2024-02-06 PROCEDURE — 80143 DRUG ASSAY ACETAMINOPHEN: CPT

## 2024-02-06 RX ORDER — IBUPROFEN 400 MG/1
400 TABLET ORAL EVERY 6 HOURS PRN
Status: DISCONTINUED | OUTPATIENT
Start: 2024-02-06 | End: 2024-02-12 | Stop reason: HOSPADM

## 2024-02-06 RX ORDER — LORAZEPAM 1 MG/1
1 TABLET ORAL ONCE
Status: DISCONTINUED | OUTPATIENT
Start: 2024-02-06 | End: 2024-02-12 | Stop reason: HOSPADM

## 2024-02-06 RX ORDER — NALTREXONE HYDROCHLORIDE 50 MG/1
50 TABLET, FILM COATED ORAL DAILY
Status: ON HOLD | COMMUNITY
End: 2024-02-12 | Stop reason: HOSPADM

## 2024-02-06 RX ORDER — HYDROXYZINE PAMOATE 50 MG/1
50 CAPSULE ORAL 4 TIMES DAILY PRN
Status: ON HOLD | COMMUNITY
End: 2024-02-12

## 2024-02-06 RX ORDER — HYDROXYZINE HYDROCHLORIDE 25 MG/1
50 TABLET, FILM COATED ORAL 3 TIMES DAILY PRN
Status: DISCONTINUED | OUTPATIENT
Start: 2024-02-06 | End: 2024-02-08

## 2024-02-06 RX ORDER — PRAZOSIN HYDROCHLORIDE 2 MG/1
2 CAPSULE ORAL NIGHTLY PRN
Status: ON HOLD | COMMUNITY
End: 2024-02-12 | Stop reason: HOSPADM

## 2024-02-06 RX ORDER — TRAZODONE HYDROCHLORIDE 50 MG/1
50 TABLET ORAL NIGHTLY PRN
Status: DISCONTINUED | OUTPATIENT
Start: 2024-02-06 | End: 2024-02-12 | Stop reason: HOSPADM

## 2024-02-06 RX ORDER — FLUOXETINE 10 MG/1
10 CAPSULE ORAL DAILY
Status: ON HOLD | COMMUNITY
End: 2024-02-12 | Stop reason: HOSPADM

## 2024-02-06 RX ORDER — ACETAMINOPHEN 325 MG/1
650 TABLET ORAL EVERY 4 HOURS PRN
Status: DISCONTINUED | OUTPATIENT
Start: 2024-02-06 | End: 2024-02-12 | Stop reason: HOSPADM

## 2024-02-06 RX ORDER — BENZTROPINE MESYLATE 2 MG/1
2 TABLET ORAL 2 TIMES DAILY
Status: ON HOLD | COMMUNITY
End: 2024-02-12 | Stop reason: HOSPADM

## 2024-02-06 RX ORDER — NICOTINE 21 MG/24HR
1 PATCH, TRANSDERMAL 24 HOURS TRANSDERMAL DAILY
Status: DISCONTINUED | OUTPATIENT
Start: 2024-02-06 | End: 2024-02-06

## 2024-02-06 RX ORDER — RISPERIDONE 1 MG/1
1 TABLET ORAL NIGHTLY
Status: ON HOLD | COMMUNITY
End: 2024-02-12 | Stop reason: HOSPADM

## 2024-02-06 NOTE — ED TRIAGE NOTES
Patient presents to ED via [employer] from [place] as an [facility] for homicidal and suicidal ideation. Police report they were called by [place] because patient locked himself in shower for two hours. Police states when the workers at [place] convinced patient to come out he appeared religiously occupied. [place]  reports patient stated\" I wish I could go to everyone's house that has hurt me and kill them\". On arrival to ED patient appeared anxious and religiously occupied . Patient states he was forced to come here and does not have any suicidal or homicidal thoughts \"just thoughts\".

## 2024-02-06 NOTE — ED NOTES
PT resting on side of bed. PT and VS assessed. Respirations equal and unlabored. PT provided water

## 2024-02-06 NOTE — ED NOTES
ED to inpatient nurses report      Chief Complaint:  Chief Complaint   Patient presents with    Suicidal    Homicidal     Present to ED from: home    MOA:     LOC: alert and orientated to name, place, date  Mobility: Independent  Oxygen Baseline: RA    Current needs required: RA     Code Status:   Full Code    What abnormal results were found and what did you give/do to treat them? See results  Any procedures or intervention occur? NA    Mental Status:  Level of Consciousness: Alert (0)    Psych Assessment:        Vitals:  Patient Vitals for the past 24 hrs:   BP Temp Temp src Pulse Resp SpO2   02/06/24 0514 (!) 178/94 -- -- 98 20 100 %   02/06/24 0134 (!) 146/97 97.9 °F (36.6 °C) Oral 96 16 100 %        LDAs:      Ambulatory Status:  No data recorded    Diagnosis:  DISPOSITION Admitted 02/06/2024 05:49:31 AM   Final diagnoses:   Suicidal ideations   Roxie (HCC)        Consults:  None     Pain Score:  Pain Assessment  Pain Assessment: None - Denies Pain    C-SSRS:   Risk of Suicide: No Risk    Sepsis Screening:  Sepsis Risk Score: 0.97    [place] Risk:       Swallow Screening        Preferred Language:   English      ALLERGIES     Invega marjan [paliperidone palmitate er]    SURGICAL HISTORY       Past Surgical History:   Procedure Laterality Date    CYST REMOVAL      knee    SHOULDER SURGERY Right        PAST MEDICAL HISTORY       Past Medical History:   Diagnosis Date    Arthritis     Depression     GERD (gastroesophageal reflux disease)     Mono exposure            Electronically signed by Tad Posada RN on 2/6/2024 at 5:57 AM

## 2024-02-06 NOTE — ED NOTES
Patient placed in safe room that is ligature resistant with continuous monitoring in place. Provider notified, requested an assessment by behavioral health . Patient belongings secured in a locked lockers outside of the room. Explained suicide prevention precautions to the patient including constant observer.

## 2024-02-06 NOTE — ED PROVIDER NOTES
[facility] EMERGENCY DEPARTMENT    EMERGENCY MEDICINE     Patient Name: Corey Webster  MRN: [id number]  YOB: 2000  Date of Evaluation: 2/6/2024  Treating Resident Physician: Leon Braun MD  Supervising Physician: Vanessa Cordoba MD    CHIEF COMPLAINT       Chief Complaint   Patient presents with    Suicidal    Homicidal       HISTORY OF PRESENT ILLNESS    HPI    History obtained from chart review and the patient.    Corey Webster is a 23 y.o. male who presents to the emergency department  from [facility]  in police custody for evaluation of suicidal and homicidal ideation.  Patient reports that he has had passive suicidal ideation for a long time and was seen outpatient psychiatrist and well-connected through [facility] and was receiving naltrexone for this initially on 25 mg and then was increased to 50 mg as his symptoms were not improving.  He was advised by them to go to the crisis unit appear in [place] which she did voluntarily earlier today.  There he disclosed to them that he had suicidal and homicidal ideation and also displayed manic behaviors.  He reportedly was in the bathroom and shower for 2 hours and would not come out.  He was subsequently transferred to our emergency department in police custody for further evaluation.     Patient denies any recent cough cold fever runny nose or symptoms.  Denies substance use.  Patient endorses hearing negative thoughts, states they are not his.    Pertinent previous and/or external records reviewed:  Chart review shows that patient is a history of bipolar and schizoaffective disorder with prior hospital admissions for same.    REVIEW OF SYSTEMS   Review of Systems  Negative unless documented in HPI    PAST MEDICAL AND SURGICAL HISTORY     Past Medical History:   Diagnosis Date    Arthritis     Depression     GERD (gastroesophageal reflux disease)     Mono exposure        Past Surgical History:   Procedure Laterality Date

## 2024-02-07 PROCEDURE — 6370000000 HC RX 637 (ALT 250 FOR IP): Performed by: PSYCHIATRY & NEUROLOGY

## 2024-02-07 PROCEDURE — 1240000000 HC EMOTIONAL WELLNESS R&B

## 2024-02-07 RX ORDER — ZIPRASIDONE MESYLATE 20 MG/ML
10 INJECTION, POWDER, LYOPHILIZED, FOR SOLUTION INTRAMUSCULAR 3 TIMES DAILY PRN
Status: DISCONTINUED | OUTPATIENT
Start: 2024-02-07 | End: 2024-02-12 | Stop reason: HOSPADM

## 2024-02-07 RX ORDER — BENZTROPINE MESYLATE 1 MG/1
0.5 TABLET ORAL DAILY
Status: DISCONTINUED | OUTPATIENT
Start: 2024-02-07 | End: 2024-02-12 | Stop reason: HOSPADM

## 2024-02-07 RX ADMIN — HYDROXYZINE HYDROCHLORIDE 50 MG: 25 TABLET, FILM COATED ORAL at 12:32

## 2024-02-07 RX ADMIN — ACETAMINOPHEN 650 MG: 325 TABLET ORAL at 18:26

## 2024-02-07 NOTE — H&P
daily.    ALLERGIES:  INVEGA SUSTENNA.    REVIEW OF SYSTEMS:  Ten-system review is negative except as noted above.    PHYSICAL EXAMINATION:  HEENT:  Within normal limits.  NECK:  Supple.  No thyromegaly.  CARDIOVASCULAR:  Normal sinus rhythm.  No murmur or gallop on  auscultation.  LUNGS:  Clear.  No wheezing or rales on auscultation.  ABDOMEN:  Soft.  Bowel sounds are present.  RECTAL AND GENITAL:  Not examined.  EXTREMITIES:  Full range of motion in all four extremities.  Peripheral  pulses are present.  NEUROLOGICAL:  Cranial nerves II-XII are grossly intact.  Reflexes are  equal bilaterally.    MENTAL STATUS EXAMINATION:  The patient appears stated age, dressed in  hospital gown.  He has good eye contact.  Fair grooming and hygiene.  He  is cooperative with the interview but argumentative.  Speech clear,  spontaneous, incoherent at times.  Mood euthymic and affect labile.  He  denies suicidal or homicidal ideation.  He denies auditory or visual  hallucination, but he is very paranoid.  Thought process is labile.  He  is alert and oriented x3.  He has fair attention and concentration.   Memory appears to be intact as tested within the context of the  interview.  Intelligence appears average.  Judgment and insight are  poor.    DIAGNOSES:  1.  Schizoaffective disorder, bipolar type.  2.  Chronic mental illness, noncompliance with psychotropics, poor  social support.    RECOMMENDATIONS:  1.  Admit to the unit.  2.  Routine labs ordered.  3.  Start Vraylar due to a long _____ since the patient has a long  history of noncompliance.  Add trazodone and hydroxyzine.  Consider  Depakote.  4.  Risks and benefits of psychotropics discussed as well as alternative  treatment.  5.  Support and reassurance given.  6.  Milieu and group therapy to develop insight to psychiatric illness  and better coping mechanism.    Upon discharge, he will be referred for outpatient management.    PATIENT STRENGTH:  None

## 2024-02-07 NOTE — BH NOTE
This RN has reviewed and agrees with Shanell Andrade LPN's data collection and has collaborated with this LPN regarding the patient's care plan.

## 2024-02-08 PROCEDURE — 1240000000 HC EMOTIONAL WELLNESS R&B

## 2024-02-08 PROCEDURE — 6370000000 HC RX 637 (ALT 250 FOR IP): Performed by: PSYCHIATRY & NEUROLOGY

## 2024-02-08 RX ORDER — HYDROXYZINE PAMOATE 50 MG/1
50 CAPSULE ORAL 4 TIMES DAILY PRN
Status: DISCONTINUED | OUTPATIENT
Start: 2024-02-08 | End: 2024-02-12 | Stop reason: HOSPADM

## 2024-02-08 RX ADMIN — HYDROXYZINE PAMOATE 50 MG: 50 CAPSULE ORAL at 20:17

## 2024-02-08 RX ADMIN — CARIPRAZINE 3 MG: 3 CAPSULE, GELATIN COATED ORAL at 07:44

## 2024-02-08 RX ADMIN — BENZTROPINE MESYLATE 0.5 MG: 1 TABLET ORAL at 07:43

## 2024-02-08 NOTE — GROUP NOTE
Group Therapy Note    Date: 2/8/2024    Group Start Time: 1500  Group End Time: 1530  Group Topic: Healthy Living/Wellness    [facility] 4E    Tawnya Devine LPN        Group Therapy Note    Attendees: 8       Notes:  did not attend    Discipline Responsible: Licensed Practical Nurse      Signature:  Tawnya Devine LPN

## 2024-02-09 PROCEDURE — 6370000000 HC RX 637 (ALT 250 FOR IP): Performed by: PSYCHIATRY & NEUROLOGY

## 2024-02-09 PROCEDURE — 1240000000 HC EMOTIONAL WELLNESS R&B

## 2024-02-09 RX ADMIN — CARIPRAZINE 3 MG: 3 CAPSULE, GELATIN COATED ORAL at 07:59

## 2024-02-09 RX ADMIN — HYDROXYZINE PAMOATE 50 MG: 50 CAPSULE ORAL at 11:00

## 2024-02-09 RX ADMIN — HYDROXYZINE PAMOATE 50 MG: 50 CAPSULE ORAL at 20:44

## 2024-02-09 RX ADMIN — HYDROXYZINE PAMOATE 50 MG: 50 CAPSULE ORAL at 10:59

## 2024-02-09 RX ADMIN — BENZTROPINE MESYLATE 0.5 MG: 1 TABLET ORAL at 07:59

## 2024-02-09 NOTE — BH NOTE
This RN has reviewed and agrees with Penelope SANTIAGO LPN's data collection and has collaborated with this LPN regarding the patient's care plan.

## 2024-02-09 NOTE — GROUP NOTE
Group Therapy Note    Date: 2/9/2024    Group Start Time: 1100  Group End Time: 1130  Group Topic: Healthy Living/Wellness    [facility] 4[facility]    Tawnya Devine LPN        Group Therapy Note    Attendees: 6       Notes:  attended    Status After Intervention:  Improved    Participation Level: Active Listener    Participation Quality: Appropriate and Attentive      Speech:  normal      Thought Process/Content: Logical      Affective Functioning: Flat      Level of consciousness:  Alert, Oriented x4, and Attentive      Response to Learning: Able to verbalize current knowledge/experience, Able to verbalize/acknowledge new learning, Able to retain information, and Capable of insight      Endings: None Reported    Modes of Intervention: Education and Socialization      Discipline Responsible: Licensed Practical Nurse, nursing students      Signature:  Tawnya Devine LPN

## 2024-02-10 PROCEDURE — 1240000000 HC EMOTIONAL WELLNESS R&B

## 2024-02-10 PROCEDURE — 6370000000 HC RX 637 (ALT 250 FOR IP): Performed by: PSYCHIATRY & NEUROLOGY

## 2024-02-10 RX ADMIN — HYDROXYZINE PAMOATE 50 MG: 50 CAPSULE ORAL at 20:38

## 2024-02-10 RX ADMIN — BENZTROPINE MESYLATE 0.5 MG: 1 TABLET ORAL at 08:13

## 2024-02-10 RX ADMIN — CARIPRAZINE 1.5 MG: 1.5 CAPSULE, GELATIN COATED ORAL at 13:35

## 2024-02-10 RX ADMIN — HYDROXYZINE PAMOATE 50 MG: 50 CAPSULE ORAL at 13:38

## 2024-02-10 RX ADMIN — CARIPRAZINE 3 MG: 3 CAPSULE, GELATIN COATED ORAL at 08:14

## 2024-02-10 NOTE — BH NOTE
This RN has reviewed and agrees with AYLA Haas LPN's data collection and has collaborated with this LPN regarding the patient's care plan.

## 2024-02-10 NOTE — GROUP NOTE
Group Therapy Note    Date: 2/10/2024    Group Start Time: 1430  Group End Time: 1530  Group Topic: Community Meeting    [facility] 4E    Tiffani Hilario        Group Therapy Note    Attendees: Patients discussed concerns on the unit, their goal for today, and participated in a question game.       Patient's Goal:  Take meds & allow a visit from his mom.    Notes:  Patient shared that he met his goal for today & that he had a good visit with his mom.    Status After Intervention:  Improved    Participation Level: Active Listener and Interactive    Participation Quality: Appropriate, Attentive, and Sharing      Speech:  normal      Thought Process/Content: Linear      Affective Functioning: Congruent      Mood: euthymic      Level of consciousness:  Alert and Attentive      Response to Learning: Able to verbalize current knowledge/experience, Able to verbalize/acknowledge new learning, Capable of insight, Able to change behavior, and Progressing to goal      Endings: None Reported    Modes of Intervention: Education, Support, Socialization, and Activity      Discipline Responsible: Behavorial Health Tech      Signature:  Tiffani Hilario

## 2024-02-11 PROCEDURE — 6370000000 HC RX 637 (ALT 250 FOR IP): Performed by: PSYCHIATRY & NEUROLOGY

## 2024-02-11 PROCEDURE — 1240000000 HC EMOTIONAL WELLNESS R&B

## 2024-02-11 RX ADMIN — BENZTROPINE MESYLATE 0.5 MG: 1 TABLET ORAL at 07:49

## 2024-02-11 RX ADMIN — CARIPRAZINE 4.5 MG: 3 CAPSULE, GELATIN COATED ORAL at 07:49

## 2024-02-11 RX ADMIN — HYDROXYZINE PAMOATE 50 MG: 50 CAPSULE ORAL at 21:52

## 2024-02-11 RX ADMIN — HYDROXYZINE PAMOATE 50 MG: 50 CAPSULE ORAL at 06:43

## 2024-02-11 NOTE — GROUP NOTE
Group Therapy Note    Date: 2/11/2024    Group Start Time: 1100  Group End Time: 1200  Group Topic: Community Meeting    [facility] 4E    Tiffani Hilario        Group Therapy Note    Attendees: Patients shared their goals for today, discussed concerns of the unit and participated in an activity and daily reading.       Patient's Goal:  To see the doctor.    Notes:  Patient talked to his doctor about discharge & will be leaving tomorrow. Patient was eager to learn ways that he can set healthy boundaries.    Status After Intervention:  Improved    Participation Level: Active Listener and Interactive    Participation Quality: Appropriate, Attentive, Sharing, and Supportive      Speech:  normal      Thought Process/Content: Linear      Affective Functioning: Congruent      Mood: euthymic      Level of consciousness:  Alert and Attentive      Response to Learning: Able to verbalize current knowledge/experience, Able to verbalize/acknowledge new learning, Capable of insight, Able to change behavior, and Progressing to goal      Endings: None Reported    Modes of Intervention: Education, Support, Socialization, and Activity      Discipline Responsible: Behavorial Health Tech      Signature:  Tiffani Hilario

## 2024-02-12 VITALS
HEART RATE: 84 BPM | HEIGHT: 65 IN | RESPIRATION RATE: 16 BRPM | DIASTOLIC BLOOD PRESSURE: 69 MMHG | BODY MASS INDEX: 26.33 KG/M2 | WEIGHT: 158 LBS | TEMPERATURE: 98.5 F | SYSTOLIC BLOOD PRESSURE: 130 MMHG | OXYGEN SATURATION: 100 %

## 2024-02-12 PROCEDURE — 5130000000 HC BRIDGE APPOINTMENT

## 2024-02-12 PROCEDURE — 6370000000 HC RX 637 (ALT 250 FOR IP): Performed by: PSYCHIATRY & NEUROLOGY

## 2024-02-12 RX ORDER — BENZTROPINE MESYLATE 0.5 MG/1
0.5 TABLET ORAL DAILY
Qty: 60 TABLET | Refills: 0 | Status: SHIPPED | OUTPATIENT
Start: 2024-02-13

## 2024-02-12 RX ORDER — HYDROXYZINE PAMOATE 50 MG/1
50 CAPSULE ORAL 4 TIMES DAILY PRN
Qty: 90 CAPSULE | Refills: 0 | Status: SHIPPED | OUTPATIENT
Start: 2024-02-12

## 2024-02-12 RX ORDER — TRAZODONE HYDROCHLORIDE 50 MG/1
50 TABLET ORAL NIGHTLY PRN
Qty: 30 TABLET | Refills: 0 | Status: SHIPPED | OUTPATIENT
Start: 2024-02-12

## 2024-02-12 RX ADMIN — BENZTROPINE MESYLATE 0.5 MG: 1 TABLET ORAL at 08:32

## 2024-02-12 RX ADMIN — HYDROXYZINE PAMOATE 50 MG: 50 CAPSULE ORAL at 08:33

## 2024-02-12 RX ADMIN — CARIPRAZINE 4.5 MG: 3 CAPSULE, GELATIN COATED ORAL at 08:32

## 2024-02-12 NOTE — PLAN OF CARE
Problem: Depression  Goal: Will be euthymic at discharge  Description: INTERVENTIONS:  1. Administer medication as ordered  2. Provide emotional support via 1:1 interaction with staff  3. Encourage involvement in milieu/groups/activities  4. Monitor for social isolation  2/10/2024 1140 by Vangie Haas LPN  Outcome: Progressing  Note: Patient denies any depression at this point in the shift    2/10/2024 0100 by Jason Nevarez RN  Outcome: Progressing  Note: Pt denies depression at this time.     Problem: Roxie  Goal: Will exhibit normal sleep and speech and no impulsivity  Description: INTERVENTIONS:  1. Administer medication as ordered  2. Set limits on impulsive behavior  3. Make attempts to decrease external stimuli as possible  2/10/2024 1140 by Vangie Haas LPN  Outcome: Progressing  Note: Patient slept 8c    2/10/2024 0100 by Jason Nevarez RN  Outcome: Progressing     Problem: Psychosis  Goal: Will report no hallucinations or delusions  Description: INTERVENTIONS:  1. Administer medication as  ordered  2. Assist with reality testing to support increasing orientation  3. Assess if patient's hallucinations or delusions are encouraging self harm or harm to others and intervene as appropriate  2/10/2024 1140 by Vangie Haas LPN  Outcome: Progressing  Note: Patient denies any hallucinations     2/10/2024 0100 by Jason Nevarez RN  Outcome: Progressing  Note: Pt denies hallucinations but remains paranoid.     Problem: Anxiety  Goal: Will report anxiety at manageable levels  Description: INTERVENTIONS:  1. Administer medication as ordered  2. Teach and rehearse alternative coping skills  3. Provide emotional support with 1:1 interaction with staff  2/10/2024 1140 by Vangie Haas LPN  Outcome: Progressing  Flowsheets (Taken 2/10/2024 1130)  Will report anxiety at manageable levels:   Administer medication as ordered   Teach and rehearse alternative coping skills   Provide emotional support with 1:1 
  Problem: Depression  Goal: Will be euthymic at discharge  Description: INTERVENTIONS:  1. Administer medication as ordered  2. Provide emotional support via 1:1 interaction with staff  3. Encourage involvement in milieu/groups/activities  4. Monitor for social isolation  2/12/2024 0036 by Jason Nevarez RN  Outcome: Progressing  Note: Pt denies depression at this time.  2/11/2024 1157 by Vangie Haas LPN  Outcome: Progressing  Note: Patient is rating mood a 7/10 with 10 being the best        Problem: Roxie  Goal: Will exhibit normal sleep and speech and no impulsivity  Description: INTERVENTIONS:  1. Administer medication as ordered  2. Set limits on impulsive behavior  3. Make attempts to decrease external stimuli as possible  2/12/2024 0036 by Jason Nevarez RN  Outcome: Progressing  2/11/2024 1157 by Vangie Haas LPN  Outcome: Progressing  Note: Patient slept 8c       Problem: Psychosis  Goal: Will report no hallucinations or delusions  Description: INTERVENTIONS:  1. Administer medication as  ordered  2. Assist with reality testing to support increasing orientation  3. Assess if patient's hallucinations or delusions are encouraging self harm or harm to others and intervene as appropriate  2/12/2024 0036 by Jason Nevarez RN  Outcome: Progressing  Note: Pt denies hallcuinations at this time.  2/11/2024 1157 by Vangie Haas LPN  Outcome: Progressing  Note: Patients denies any hallucinations or delusions at this point in the shift        Problem: Anxiety  Goal: Will report anxiety at manageable levels  Description: INTERVENTIONS:  1. Administer medication as ordered  2. Teach and rehearse alternative coping skills  3. Provide emotional support with 1:1 interaction with staff  2/12/2024 0036 by Jason Nevarez RN  Outcome: Progressing  Flowsheets (Taken 2/12/2024 0036)  Will report anxiety at manageable levels:   Administer medication as ordered   Provide emotional support with 1:1 interaction with 
  Problem: Depression  Goal: Will be euthymic at discharge  Description: INTERVENTIONS:  1. Administer medication as ordered  2. Provide emotional support via 1:1 interaction with staff  3. Encourage involvement in milieu/groups/activities  4. Monitor for social isolation  2/12/2024 1000 by Jia Magallon RN  Outcome: Adequate for Discharge  2/12/2024 0036 by Jason Nevarez RN  Outcome: Progressing  Note: Pt denies depression at this time.     Problem: Roxie  Goal: Will exhibit normal sleep and speech and no impulsivity  Description: INTERVENTIONS:  1. Administer medication as ordered  2. Set limits on impulsive behavior  3. Make attempts to decrease external stimuli as possible  2/12/2024 1000 by Jia Magallon RN  Outcome: Adequate for Discharge  2/12/2024 0036 by Jsaon Nevarez RN  Outcome: Progressing     Problem: Psychosis  Goal: Will report no hallucinations or delusions  Description: INTERVENTIONS:  1. Administer medication as  ordered  2. Assist with reality testing to support increasing orientation  3. Assess if patient's hallucinations or delusions are encouraging self harm or harm to others and intervene as appropriate  2/12/2024 1000 by Jia Magallon RN  Outcome: Adequate for Discharge  2/12/2024 0036 by Jason Nevarez RN  Outcome: Progressing  Note: Pt denies hallcuinations at this time.     Problem: Anxiety  Goal: Will report anxiety at manageable levels  Description: INTERVENTIONS:  1. Administer medication as ordered  2. Teach and rehearse alternative coping skills  3. Provide emotional support with 1:1 interaction with staff  2/12/2024 1000 by Jia Magallon RN  Outcome: Adequate for Discharge  2/12/2024 0036 by Jason Nevarez RN  Outcome: Progressing  Flowsheets (Taken 2/12/2024 0036)  Will report anxiety at manageable levels:   Administer medication as ordered   Provide emotional support with 1:1 interaction with staff   Teach and rehearse alternative coping skills  Note: Pt reports \"not 
  Problem: Depression  Goal: Will be euthymic at discharge  Description: INTERVENTIONS:  1. Administer medication as ordered  2. Provide emotional support via 1:1 interaction with staff  3. Encourage involvement in milieu/groups/activities  4. Monitor for social isolation  2/7/2024 1208 by Humes, Heather, RN  Outcome: Progressing  Note: Ongoing, patient declined to rate mood this morning.      Problem: Roxie  Goal: Will exhibit normal sleep and speech and no impulsivity  Description: INTERVENTIONS:  1. Administer medication as ordered  2. Set limits on impulsive behavior  3. Make attempts to decrease external stimuli as possible  2/7/2024 1208 by Humes, Heather, RN  Outcome: Progressing  Note: Patient reports sleeping well last shift 7 hours per staff report.      Problem: Psychosis  Goal: Will report no hallucinations or delusions  Description: INTERVENTIONS:  1. Administer medication as  ordered  2. Assist with reality testing to support increasing orientation  3. Assess if patient's hallucinations or delusions are encouraging self harm or harm to others and intervene as appropriate  2/7/2024 1208 by Humes, Heather, RN  Outcome: Not Progressing  Note: Patient denies hallucinations but is religiously preoccupied.      Problem: Anxiety  Goal: Will report anxiety at manageable levels  Description: INTERVENTIONS:  1. Administer medication as ordered  2. Teach and rehearse alternative coping skills  3. Provide emotional support with 1:1 interaction with staff  2/7/2024 1208 by Humes, Heather, RN  Outcome: Progressing  Flowsheets (Taken 2/7/2024 0156 by Shanell Andrade LPN)  Will report anxiety at manageable levels:   Administer medication as ordered   Provide emotional support with 1:1 interaction with staff  Note: Patient reports some anxiety this shift, declined PRN atarax.      Problem: Involuntary Admit  Goal: Will cooperate with staff recommendations and doctor's orders and will demonstrate appropriate 
  Problem: Depression  Goal: Will be euthymic at discharge  Description: INTERVENTIONS:  1. Administer medication as ordered  2. Provide emotional support via 1:1 interaction with staff  3. Encourage involvement in milieu/groups/activities  4. Monitor for social isolation  2/7/2024 2216 by Penelope Haro LPN  Outcome: Not Progressing  2/7/2024 1208 by Humes, Heather, RN  Outcome: Progressing  Note: Ongoing, patient declined to rate mood this morning.   2/7/2024 1052 by Humes, Heather, RN  Outcome: Progressing  Note: Ongoing, patient declined to rate mood this shift.      Problem: Roxie  Goal: Will exhibit normal sleep and speech and no impulsivity  Description: INTERVENTIONS:  1. Administer medication as ordered  2. Set limits on impulsive behavior  3. Make attempts to decrease external stimuli as possible  2/7/2024 2216 by Penelope Haro LPN  Outcome: Not Progressing  2/7/2024 1208 by Humes, Heather, RN  Outcome: Progressing  Note: Patient reports sleeping well last shift 7 hours per staff report.      Problem: Psychosis  Goal: Will report no hallucinations or delusions  Description: INTERVENTIONS:  1. Administer medication as  ordered  2. Assist with reality testing to support increasing orientation  3. Assess if patient's hallucinations or delusions are encouraging self harm or harm to others and intervene as appropriate  2/7/2024 2216 by Penelope Haro LPN  Outcome: Not Progressing  2/7/2024 1208 by Humes, Heather, RN  Outcome: Not Progressing  Note: Patient denies hallucinations but is religiously preoccupied.      Problem: Involuntary Admit  Goal: Will cooperate with staff recommendations and doctor's orders and will demonstrate appropriate behavior  Description: INTERVENTIONS:  1. Treat underlying conditions and offer medication as ordered  2. Educate regarding involuntary admission procedures and rules  3. Contain excessive/inappropriate behavior per unit and hospital policies  2/7/2024 2216 by Estrellita 
  Problem: Depression  Goal: Will be euthymic at discharge  Description: INTERVENTIONS:  1. Administer medication as ordered  2. Provide emotional support via 1:1 interaction with staff  3. Encourage involvement in milieu/groups/activities  4. Monitor for social isolation  Outcome: Not Progressing  Note: Pt is unsure of his discharge plan, spoke of his apartment. Pt states he is \" Anxious about going back home, unsure about his future\". Will continue to monitor this shift.      Problem: Roxie  Goal: Will exhibit normal sleep and speech and no impulsivity  Description: INTERVENTIONS:  1. Administer medication as ordered  2. Set limits on impulsive behavior  3. Make attempts to decrease external stimuli as possible  Outcome: Not Progressing  Note: Pt came in upon admission running on little to no sleep. Pt did sleep some of day shift 2/6/24. Pt speech is clear, pt is highly distractible, extremely religiously preoccupied, anxious and fidgety. Pt has prn meds per MAR. Will continue to monitor this shift.      Problem: Psychosis  Goal: Will report no hallucinations or delusions  Description: INTERVENTIONS:  1. Administer medication as  ordered  2. Assist with reality testing to support increasing orientation  3. Assess if patient's hallucinations or delusions are encouraging self harm or harm to others and intervene as appropriate  Outcome: Not Progressing  Note: Pt denies hallucinations. Pt is extremely religiously preoccupied      Problem: Anxiety  Goal: Will report anxiety at manageable levels  Description: INTERVENTIONS:  1. Administer medication as ordered  2. Teach and rehearse alternative coping skills  3. Provide emotional support with 1:1 interaction with staff  Outcome: Not Progressing  Flowsheets (Taken 2/7/2024 0156)  Will report anxiety at manageable levels:   Administer medication as ordered   Provide emotional support with 1:1 interaction with staff  Note: Pt states he is \" anxious, unsure about future 
  Problem: Depression  Goal: Will be euthymic at discharge  Description: INTERVENTIONS:  1. Administer medication as ordered  2. Provide emotional support via 1:1 interaction with staff  3. Encourage involvement in milieu/groups/activities  4. Monitor for social isolation  Outcome: Progressing  Note: Discharge planning in progress, patient not discharged this evening.        Problem: Roxie  Goal: Will exhibit normal sleep and speech and no impulsivity  Description: INTERVENTIONS:  1. Administer medication as ordered  2. Set limits on impulsive behavior  3. Make attempts to decrease external stimuli as possible  Outcome: Progressing  Note: Patient resting in bed with eyes closed, respirations unlabored.     Problem: Psychosis  Goal: Will report no hallucinations or delusions  Description: INTERVENTIONS:  1. Administer medication as  ordered  2. Assist with reality testing to support increasing orientation  3. Assess if patient's hallucinations or delusions are encouraging self harm or harm to others and intervene as appropriate  Outcome: Progressing  Note: Patient denies hallucinations.     Problem: Anxiety  Goal: Will report anxiety at manageable levels  Description: INTERVENTIONS:  1. Administer medication as ordered  2. Teach and rehearse alternative coping skills  3. Provide emotional support with 1:1 interaction with staff  Outcome: Progressing  Flowsheets (Taken 2/11/2024 0334)  Will report anxiety at manageable levels:   Administer medication as ordered   Provide emotional support with 1:1 interaction with staff  Note: Patient stated anxiety has improved.     Problem: Involuntary Admit  Goal: Will cooperate with staff recommendations and doctor's orders and will demonstrate appropriate behavior  Description: INTERVENTIONS:  1. Treat underlying conditions and offer medication as ordered  2. Educate regarding involuntary admission procedures and rules  3. Contain excessive/inappropriate behavior per unit and 
hallucinations observed.     Problem: Anxiety  Goal: Will report anxiety at manageable levels  Description: INTERVENTIONS:  1. Administer medication as ordered  2. Teach and rehearse alternative coping skills  3. Provide emotional support with 1:1 interaction with staff  2/8/2024 2124 by Penelope Haro LPN  Outcome: Not Progressing  Flowsheets (Taken 2/8/2024 2119)  Will report anxiety at manageable levels: Administer medication as ordered  Note: Pt reports having medium anxiety. PRN Visteral given  2/8/2024 1434 by Jia Magallon, RN  Outcome: Progressing  Flowsheets (Taken 2/8/2024 1013)  Will report anxiety at manageable levels: Administer medication as ordered  Note: Verbalizes feeling anxious, refused Atarax, states \"I can only take vistaril, that other stuff made me throw up.\"     Problem: Pain  Goal: Verbalizes/displays adequate comfort level or baseline comfort level  2/8/2024 2124 by Penelope Haro LPN  Outcome: Not Progressing  Flowsheets (Taken 2/7/2024 2216)  Verbalizes/displays adequate comfort level or baseline comfort level: Assess pain using appropriate pain scale  Note: Pt reports having 6/10 pain in both right and left eyeballs. PRN Tylenol offered but pt denied   2/8/2024 1434 by Jia Magallon, RN  Note: Verbalized pain in his eyes, states \"I know my glasses are dirty but, it hurts when I look close up\", refused Ibuprofen when offered.       Problem: Involuntary Admit  Goal: Will cooperate with staff recommendations and doctor's orders and will demonstrate appropriate behavior  Description: INTERVENTIONS:  1. Treat underlying conditions and offer medication as ordered  2. Educate regarding involuntary admission procedures and rules  3. Contain excessive/inappropriate behavior per unit and hospital policies  2/8/2024 2124 by Penelope Haro LPN  Outcome: Progressing  Flowsheets (Taken 2/8/2024 2119)  Will cooperate with staff recommendations and doctor's orders and will demonstrate 
Verbalizes/displays adequate comfort level or baseline comfort level  Note: Verbalized pain in his eyes, states \"I know my glasses are dirty but, it hurts when I look close up\", refused Ibuprofen when offered.     Care plan reviewed with patient.  Patient  verbalize understanding of the plan of care and contribute to goal setting.    
0334 by Luana Gonzalez, RN  Outcome: Progressing  Flowsheets  Taken 2/11/2024 0334  Nursing Interventions for Elopement Risk: Make sure patient has all necessary personal care items  Taken 2/10/2024 2100  Nursing Interventions for Elopement Risk: Make sure patient has all necessary personal care items     Problem: Pain  Goal: Verbalizes/displays adequate comfort level or baseline comfort level  2/11/2024 1157 by Vangie Haas LPN  Outcome: Progressing  Flowsheets (Taken 2/11/2024 0730)  Verbalizes/displays adequate comfort level or baseline comfort level:   Assess pain using appropriate pain scale   Encourage patient to monitor pain and request assistance   Administer analgesics based on type and severity of pain and evaluate response  Note: Patient denies any pain at this point in the shift    2/11/2024 0334 by Luana Gonzalez, RN  Outcome: Progressing  Flowsheets (Taken 2/11/2024 0334)  Verbalizes/displays adequate comfort level or baseline comfort level: Encourage patient to monitor pain and request assistance  Note: Patient denies pain.   Care plan reviewed with patient.  Patient  verbalize understanding of the plan of care and contribute to goal setting.      
monitor pain and request assistance  Note: Verbalizes pain, but denies need for medication    Care plan reviewed with patient.  Patient verbalized understanding of the plan of care and contribute to goal setting.         
denies need for medication  2/8/2024 2124 by Penelope Haro LPN  Outcome: Not Progressing  Flowsheets (Taken 2/7/2024 2216)  Verbalizes/displays adequate comfort level or baseline comfort level: Assess pain using appropriate pain scale  Note: Pt reports having 6/10 pain in both right and left eyeballs. PRN Tylenol offered but pt denied    Care plan reviewed with patient.  Patient verbalize understanding of the plan of care and contribute to goal setting.

## 2024-02-12 NOTE — GROUP NOTE
Group Therapy Note    Date: 2/12/2024    Group Start Time: 1015  Group End Time: 1045  Group Topic: Healthy Living/Wellness    [facility]    Tawnya Devine LPN        Group Therapy Note    Attendees: 10       Notes:  did not attend    Discipline Responsible: Licensed Practical Nurse, nursing students      Signature:  Tawnya Deivne LPN

## 2024-02-12 NOTE — PROGRESS NOTES
Group Therapy Note      Status After Intervention:  Unchanged    Participation Level: Interactive    Participation Quality: Sharing      Speech:  loud      Thought Process/Content: Logical      Affective Functioning: Blunted      Mood: anxious      Level of consciousness:  Alert, Oriented x4, and Attentive      Response to Learning: Able to verbalize current knowledge/experience      Endings: None Reported    Modes of Intervention: Socialization      Discipline Responsible: Registered Nurse      Signature:  Luana Gonzalez RN    
                                                                    Group Therapy Note    Date: 2/7/2024  Start Time: 1330  End Time:  1415  Number of Participants: 9    Type of Group: Psychotherapy    Notes:  Patient is present in group. Group members discussed the topic of perseverance and how it plays a role in their daily lives. Patients discussed events leading to their admission in relation to the group topic. Patient upset due to other group member. Discussed this with patient after group.    Status After Intervention:  Unchanged    Participation Level: Active Listener and Interactive    Participation Quality: Appropriate, Attentive, Sharing, and Supportive      Speech:  normal      Thought Process/Content: Logical  Linear      Affective Functioning: Congruent      Mood: elevated      Level of consciousness:  Alert, Oriented x4, and Attentive      Response to Learning: Able to verbalize current knowledge/experience, Able to retain information, Capable of insight, and Able to change behavior      Endings: None Reported    Modes of Intervention: Education, Support, Socialization, Exploration, Clarifying, and Problem-solving      Discipline Responsible: /Counselor      Signature:  SOL Cheng   
                Goal Wrap-Up/Relaxation Group    Date: 2/10/2024  Start Time: 2000  End Time:  2020    Type of Group: Goal Wrap Up    States that goal today was:     Goal for today was Met    Patient Participated in group/activities appropriately    Patients goal today: \"Take meds and shower\"    Pt was friendly and cooperative during evening group.      Signature: Jason Nevarez RN  
                Goal Wrap-Up/Relaxation Group    Date: 2/12/2024  Start Time: 2000  End Time:  2020    Type of Group: Goal Wrap Up    States that goal today was: \"Take meds, go to group and shower\", Reports he took his meds and went to group.    Goal for today was Still working on it /Partially met    Patient Participated in group/activities appropriately    Patient was friendly and cooperative during evening group.      Signature: Jason Nevarez RN  
  Group Therapy Note    Date: 2/9/2024  Start Time: 1330  End Time:  1430  Number of Participants: 10    Type of Group: Psychotherapy      Notes:  Pt is present for group. The group members explored life experiences in particular issues of grief and other painful life circumstances. The processed the relationship to those circumstances to their present day difficulties. Group members identified maladaptive patterns of coping which have created somewhat of an emotional infections. Group members discussed the impact of the don't talk, don't feel and don't trust rules as contributing factors to their maladaptive coping skills and increased levels of depression and anxiety.     Status After Intervention:  Improved    Participation Level: Active Listener and Interactive    Participation Quality: Appropriate, Attentive, Sharing, and Supportive      Speech:  normal      Thought Process/Content: Logical  Linear      Affective Functioning: Congruent      Mood: dysphoric      Level of consciousness:  Alert, Oriented x4, and Attentive      Response to Learning: Able to verbalize current knowledge/experience, Able to verbalize/acknowledge new learning, Able to retain information, Capable of insight, Able to change behavior, and Progressing to goal      Endings: None Reported    Modes of Intervention: Education, Support, Socialization, Exploration, Clarifying, Problem-solving, and Activity      Discipline Responsible: /Counselor      Signature:  SOL Barker  
24 hour chart review complete   
24 hour chart review completed    
24 hour chart review completed    
24 hour chart review completed.  
[facility]   Admission Note   Admission Type: Involuntary    Reason for Admission: \"I have theories but I don't know.I went to the crisis center to find peace and they pink slipped me here for no good reason\"    Patient Strengths/Barriers  Strengths (Must Choose Two): Support from family, Sense of humor  Barriers: Other (comment) (Preoccupied with Taoism and)    Addictive Behavior  In the Past 3 Months, Have You Felt or Has Someone Told You That You Have a Problem With  : None    Medical Problems:   Past Medical History:   Diagnosis Date    Arthritis     Depression     GERD (gastroesophageal reflux disease)     Mono exposure        Status EXAM:  Mental Status and Behavioral Exam  Normal: No  Level of Assistance: Independent/Self  Facial Expression: Elevated, Exaggerated  Affect: Unstable  Level of Consciousness: Alert  Frequency of Checks: 4 times per hour, close  Mood:Normal: No  Mood: Suspicious, Other (comment) (Religiously Preoccupied/Sexually preoccupied at times)  Motor Activity:Normal: No  Motor Activity: Increased  Eye Contact: Good  Observed Behavior: Cooperative, Preoccupied, Hypermobile  Sexual Misconduct History: Past - no  Preception: [place] to person, [place] to time, [place] to place  Attention:Normal: No  Attention: Hyperalert  Thought Processes: Flight of ideas  Thought Content:Normal: No  Thought Content: Paranoia, Preoccupations  Depression Symptoms: Impaired concentration, Change in energy level, Sleep disturbance, Other (comment) (Patient reports depression related to losing a friend who was 31.)  Anxiety Symptoms: Generalized  Roxie Symptoms: Flight of ideas, Rapid cycling, Poor judgment, Increased energy, Hypersexuality, Less need to sleep  Hallucinations: None (Patient denies)  Delusions: Yes  Delusions: Paranoid, Mandaen (Preoccupied with Mandaen/ sexual at times)  Memory:Normal: No  Memory: Confabulation  Insight and Judgment: No  Insight and Judgment: Poor judgment, Poor 
Behavioral Health   Discharge Note    Pt discharged with followings belongings:   Dental Appliances: None  Vision - Corrective Lenses: Eyeglasses (Patient has eyeglasses on person.)  Hearing Aid: None  Jewelry: None  Body Piercings Removed: N/A  Clothing: Socks, Shirt, Shorts, Footwear, Jacket/Coat  Other Valuables: Money, Wallet (Wallet with cards &$1 sent down with valuables envelope #[id number])   Valuables retrieved from safe, security envelope number:  [id number] and returned to patient.  Patient left department with staff via ambulatory.  Discharged to home. \"An Important Message from Medicare About Your Rights\" (IMM) form photocopy original from admission and provided to pt at least 4 hours prior to discharge yes. \"An Important Message from [employer] About Your Rights\" (IMM) form photocopy original from admission. N/A. If pt left within 4 hours of receiving 2nd delivery of IMM, this is because pt was agreeable with hospital discharge.  Patient/guardian education on aftercare instructions: Yes  Bridge appointment completed:  yes.  Reviewed Discharge Instructions with patient/family/nursing facility.  Patient/family verbalizes understanding and agreement with the discharge plan using the teachback method. Patient/family verbalize understanding of AVS:Yes    Status EXAM upon discharge:  Mental Status and Behavioral Exam  Normal: Yes  Level of Assistance: Independent/Self  Facial Expression: Brightened  Affect: Appropriate  Level of Consciousness: Alert  Frequency of Checks: 4 times per hour, close  Mood:Normal: Yes  Mood: Anxious  Motor Activity:Normal: Yes  Motor Activity: Increased  Eye Contact: Good  Observed Behavior: Cooperative  Sexual Misconduct History: Current - no  Preception: [place] to person  Attention:Normal: Yes  Attention: Unable to concentrate  Thought Processes: Circumstantial  Thought Content:Normal: Yes  Thought Content: Preoccupations  Depression Symptoms: No problems reported or 
[facility]  Day [facility] Interdisciplinary Treatment Plan NOTE    Review Date & Time: 02/08/24       Patient was not in treatment team    Admission Type:   Admission Type: Involuntary    Reason for admission:  Reason for Admission: \"I have theories but I don't know.I went to the crisis center to find peace and they pink slipped me here for no good reason\"  Estimated Length of Stay Update: 02/12/24  Estimated Discharge Date Update: 1-3 days    Patient Strengths/Barriers  Strengths (Must Choose Two): Support from family, Sense of humor  Barriers: Other (comment) (Preoccupied with Latter-day and)  Addictive Behavior:Addictive Behavior  In the Past 3 Months, Have You Felt or Has Someone Told You That You Have a Problem With  : None  Medical Problems:  Past Medical History:   Diagnosis Date    Arthritis     Depression     GERD (gastroesophageal reflux disease)     Mono exposure        Risk:  Fall Risk   Mo Scale Mo Scale Score: 22    Status EXAM:   Mental Status and Behavioral Exam  Normal: No  Level of Assistance: Independent/Self  Facial Expression: Avoids Gaze, Exaggerated  Affect: Unstable  Level of Consciousness: Alert  Frequency of Checks: 4 times per hour, close  Mood:Normal: No  Mood: Anxious, Suspicious  Motor Activity:Normal: No  Motor Activity: Decreased  Eye Contact: Poor  Observed Behavior: Withdrawn, Guarded  Sexual Misconduct History: Current - no  Preception: [place] to person, [place] to time, [place] to place, [place] to situation  Attention:Normal: No  Attention: Distractible, Hyperalert  Thought Processes: Flight of ideas  Thought Content:Normal: No  Thought Content: Paranoia, Preoccupations  Depression Symptoms: No problems reported or observed.  Anxiety Symptoms: Generalized  Roxie Symptoms: Flight of ideas  Hallucinations: None  Delusions: Yes  Delusions: Paranoid  Memory:Normal: No  Memory: Confabulation  Insight and Judgment: No  Insight and Judgment: Poor judgment, Poor insight, 
[facility]  Initial Interdisciplinary Treatment Plan NOTE    REVIEW DATE AND TIME: 2/06/24 15:29    PATIENT was IN TREATMENT TEAM.  See Multidisciplinary Treatment Team sheet for participants.    ADMISSION TYPE:   Admission Type: Involuntary    REASON FOR ADMISSION:  Reason for Admission: \"I have theories but I don't know.I went to the crisis center to find peace and they pink slipped me here for no good reason\"      Estimated Length of Stay Update:  3-5 days  Estimated Discharge Date Update: 2/08/24    Patient Strengths/Barriers  Strengths (Must Choose Two): Support from family, Sense of humor  Barriers: Other (comment) (Preoccupied with Mandaeism and)  Addictive Behavior:Addictive Behavior  In the Past 3 Months, Have You Felt or Has Someone Told You That You Have a Problem With  : None  Medical Problems:  Past Medical History:   Diagnosis Date    Arthritis     Depression     GERD (gastroesophageal reflux disease)     Mono exposure        EDUCATION:   Learner Progress Toward Treatment Goals: Reviewed results and recommendations of this team, Reviewed group plan and strategies, Reviewed signs, symptoms and risk of self harm and violent behavior, and Reviewed goals and plan of care    Method: Individual    Outcome: Verbalized understanding, Demonstrated Understanding, and Needs reinforcement    PATIENT GOALS: \"I want to feel at peace\"    OQ TOP QUALITY PRIORITIES FOR THE PATIENT AS IDENTIFIED ON ADMISSION ADMINISTRATION:        ND    PLAN/TREATMENT RECOMMENDATIONS UPDATE:   What is the most important thing we can help you with while you are here? See above  Who is your support system? Pt reports mom, dad, and God are his support.  Do you have follow-up providers? [facility] in [place], [place]  Do you have the ability to pay for your medications? Yes  Where will you be residing when you leave the hospital? Pt will reside in his home.  Will need a return to work slip or FMLA paper completion? Yes      GOALS 
CANDICE has reviewed and agrees with ZACH Haro LPN's shift   Assessment.    Yarely Stone, RN  2/8/2024  
Case management-Requested a return call from the [facility] Discharge Planner.   
Corey's father in to see patient with good interaction  
Daily Progress Note  Jayme Johnson MD  2/10/2024    Reviewed patient's current plan of care and vital signs with nursing staff.  Sleep: 8 hours last night  Attending groups: Yes  No reported Suicidal thought; Fair interaction with peers & staff. He had some paranoia last night but no hallucinations.  Mood is 7 on a scale of 1 to 10 with 10 is feeling normal.  He has no agitation and is not as intrusive.  He is hopeful for the future.    SUBJECTIVE:    Patient is feeling better. SUICIDAL IDEATION denies suicidal ideation.  Patient does not have medication side effects.    ROS: Patient has new complaints:  No  Sleeping adequately:  Yes  Visitors: No    Mental Status Examination:  Patient is cooperative. Speech: Speech: Normal rate and tone but loud.  No abnormal movements, tics or mannerisms.  Mood dysthymic; affect bright. Suicidal ideation Absent.  Homicidal ideations Absent.   Hallucinations Absent.  Delusions not prominent. Thought Content: Normal. Thought Processes: Less flight of ideas. Alert and oriented X 3.  Attention and concentration fair. MEMORY intact.  Insight and Judgement impaired judgment.      Data   height is 1.651 m (5' 5\") and weight is 71.7 kg (158 lb). His oral temperature is 98.4 °F (36.9 °C). His blood pressure is 127/76 and his pulse is 87. His respiration is 16 and oxygen saturation is 99%.   Labs:            Medications  Current Facility-Administered Medications: hydrOXYzine pamoate (VISTARIL) capsule 50 mg, 50 mg, Oral, 4x Daily PRN  benztropine (COGENTIN) tablet 0.5 mg, 0.5 mg, Oral, Daily  ziprasidone (GEODON) injection 10 mg, 10 mg, IntraMUSCular, TID PRN  LORazepam (ATIVAN) tablet 1 mg, 1 mg, Oral, Once  acetaminophen (TYLENOL) tablet 650 mg, 650 mg, Oral, Q4H PRN  ibuprofen (ADVIL;MOTRIN) tablet 400 mg, 400 mg, Oral, Q6H PRN  traZODone (DESYREL) tablet 50 mg, 50 mg, Oral, Nightly PRN  magnesium hydroxide (MILK OF MAGNESIA) 400 MG/5ML suspension 30 mL, 30 mL, Oral, Daily PRN  cariprazine 
Daily Progress Note  Jayme Johnson MD  2/12/2024    Reviewed patient's current plan of care and vital signs with nursing staff.  Sleep: 7 hours last night  Attending groups: Yes  No reported Suicidal thought; good interaction with peers & staff, no paranoia and no agitation. Mood is 8 on a scale of 1 to 10 with 10 is feeling normal. He is hopeful for the future and feels ready for discharge.    SUBJECTIVE:    Patient is feeling better. SUICIDAL IDEATION denies suicidal ideation.  Patient does not have medication side effects.    ROS: Patient has new complaints:  No  Sleeping adequately:  Yes  Visitors: No    Mental Status Examination:  Patient is cooperative. Speech: Speech: Normal rate and tone but loud.  No abnormal movements, tics or mannerisms.  Mood euthymic; affect appropriate. Suicidal ideation Absent.  Homicidal ideations Absent.   Hallucinations Absent.  Delusions Absent. Thought Content: Normal. Thought Processes: Goal oriented. Alert and oriented X 3. Attention and concentration fair. MEMORY intact.  Insight and Judgement limited.      Data   height is 1.651 m (5' 5\") and weight is 71.7 kg (158 lb). His tympanic temperature is 98.5 °F (36.9 °C). His blood pressure is 130/69 and his pulse is 84. His respiration is 16 and oxygen saturation is 100%.   Labs:            Medications  Current Facility-Administered Medications: cariprazine hcl (VRAYLAR) capsule 4.5 mg, 4.5 mg, Oral, Daily  hydrOXYzine pamoate (VISTARIL) capsule 50 mg, 50 mg, Oral, 4x Daily PRN  benztropine (COGENTIN) tablet 0.5 mg, 0.5 mg, Oral, Daily  ziprasidone (GEODON) injection 10 mg, 10 mg, IntraMUSCular, TID PRN  LORazepam (ATIVAN) tablet 1 mg, 1 mg, Oral, Once  acetaminophen (TYLENOL) tablet 650 mg, 650 mg, Oral, Q4H PRN  ibuprofen (ADVIL;MOTRIN) tablet 400 mg, 400 mg, Oral, Q6H PRN  traZODone (DESYREL) tablet 50 mg, 50 mg, Oral, Nightly PRN  magnesium hydroxide (MILK OF MAGNESIA) 400 MG/5ML suspension 30 mL, 30 mL, Oral, Daily 
Daily Progress Note  Jayme Johnson MD  2/9/2024    Reviewed patient's current plan of care and vital signs with nursing staff.  Sleep:  7.5 hours last night  Attending groups: Yes, last night  No reported Suicidal thought; Fair interaction with peers & staff.  He has no paranoia but he is very somatic complaining of muscle soreness, vision trouble and very poor motivation.  He slept all day yesterday even through lunch and dinner.  He denies feeling depressed but rates his mood as 5.5 on a scale of 1 to 10 with 10 is feeling normal.    SUBJECTIVE:    Patient is feeling better. SUICIDAL IDEATION denies suicidal ideation.  Patient does not have medication side effects.    ROS: Patient has new complaints:  No  Sleeping adequately:  Yes  Visitors: No    Mental Status Examination:  Patient is cooperative. Speech: Speech: Normal rate and tone but loud.  No abnormal movements, tics or mannerisms.  Mood dysthymic; affect bright. Suicidal ideation Absent.  Homicidal ideations Absent.   Hallucinations Absent.  Delusions not prominent. Thought Content: Normal. Thought Processes: Less flight of ideas. Alert and oriented X 3.  Attention and concentration fair. MEMORY intact.  Insight and Judgement impaired judgment.      Data   height is 1.651 m (5' 5\") and weight is 71.7 kg (158 lb). His oral temperature is 98.4 °F (36.9 °C). His blood pressure is 115/75 and his pulse is 80. His respiration is 18 and oxygen saturation is 98%.   Labs:            Medications  Current Facility-Administered Medications: hydrOXYzine pamoate (VISTARIL) capsule 50 mg, 50 mg, Oral, 4x Daily PRN  benztropine (COGENTIN) tablet 0.5 mg, 0.5 mg, Oral, Daily  ziprasidone (GEODON) injection 10 mg, 10 mg, IntraMUSCular, TID PRN  LORazepam (ATIVAN) tablet 1 mg, 1 mg, Oral, Once  acetaminophen (TYLENOL) tablet 650 mg, 650 mg, Oral, Q4H PRN  ibuprofen (ADVIL;MOTRIN) tablet 400 mg, 400 mg, Oral, Q6H PRN  traZODone (DESYREL) tablet 50 mg, 50 mg, Oral, Nightly 
[facility] CRISIS ASSESSMENT    Chief Complaint:   Suicidal thoughts, homicidal thoughts, psychosis        Provisional Diagnosis: Schizoaffective Disorder       Risk, Psychosocial and Contextual Factors: (homeless, lack of social support etc.): Pt report receiving death threats, is fearful      Current  Treatment: [facility]        Present Suicidal Behavior:      Verbal:  Denies     Attempt:  Denies       Access to Weapons:   Denies       C-SSRS Current Suicide Risk: Low, Moderate or High:    Is on an EMC      Past Suicidal Behavior:       Verbal:  X    Attempts:   Denies       Self-Injurious/Self-Mutilation: Denies       Traumatic Event Within Past 2 Weeks: Reportedly received death threats recently      Current Abuse:  Reported received death threats recently       Legal: Denies      Violence: Denies       Protective Factors:  State father is supportive, is employed at [employer] in [employer]       Housing:   Lives alone, receive social security disability      CPAP/Oxygen/Ambulation Difficulties:   None      Critical Labs:         Risk Factors: See Summary       Clinical Summary:      Pt is a 23 year old male with a history of schizoaffective disorder bipolar type [facility] [place] [facility] due to suicidal thoughts, homicidal thoughts and psychosis.  Per EMC completed by [facility]:    Corey Webster is a 23 year old male.  Client presented religiously preoccupied. Client presented homicidal and reported he wants to break into everyone's home who has done him wrong and kill them.  Client reported suicidal ideations with passive suicidal thoughts consistently. Client presented with manic behaviors with rapid pressured speech.  Client showered for 2 hours and refuses to come out.  Corey would benefit from inpatient psychiatric treatment for suicidal and homicidal ideations.     Pt reportedly requested his father transport him to [facility] to \"get voluntary help with 
Discharge planning-Corey is scheduled for a follow up appoitment with Kathryn [facility] on 02/16/24 at 1:00 pm.   
INPATIENT RECREATIONAL THERAPY  ADULT BEHAVIORAL SERVICES  ASSESSMENT    REFERRING PHYSICIAN: Dr. Jayme Johnson   DIAGNOSIS:   bipolar disorder with psychotic features.  PRECAUTIONS:    HISTORY OF PRESENT ILLNESS/INJURY:   PMH:  Please see medical chart for prior medical history, allergies, and medication.    HISTORY OF PSYCHIATRIC TREATMENT: unknown  YOB: 2000  GENDER:  male  MARITAL STATUS:  single  EMPLOYMENT STATUS:  disabled  LIVING SITUATION:  alone  IDENTIFIED SUPPORT SYSTEM:  unknown  EDUCATIONAL LEVEL: unknown  MEDICATION/DRUG USE: unknown      COGNITION: WFL  ATTENTION: tangential   RELAXATION: large arm movements with conversation  SELF-ESTEEM:  appropriate  MOTIVATION:  tangential with answering questions    SOCIAL SKILLS:  appropriate  FRUSTRATION TOLERANCE:  easily frustrated  ATTENTION SEEKING: yes  COOPERATION: difficulty appropriately answering questions  AFFECT: appropriate   APPEARANCE: appropriate    HEARING:  WFL  VISION:  WFL   VERBAL COMMUNICATION:  WFL  WRITTEN COMMUNICATION:  WFL    COORDINATION: WFL   MOBILITY: WFL    GOALS:  unknown      
[facility]   PROGRESS NOTE      Patient: Corey Webster  Room #: 4E-69/069-A            YOB: 2000  Age: 23 y.o.  Gender: male            Admit Date & Time: 2/6/2024  1:28 AM    Assessment:  Corey voiced anxiousness about being discharged soon and the struggle to live his kaylee.  He was honest about being motivated to good self-care and living his kaylee on some days and not being motivated on other days.  He is praying for his family; both his adoptive and biological parents, his siblings, and his two dogs.  He shared about being a member of a Taoist in [place].     Interventions:  I spent time engaging Corey in conversation about setting goal and working on living spiritual disciplines even on days when he does not feel motivated to live his face.    Outcomes:  Corey expressed gratitude for the visit.    Plan:    Continue to support patient through encouragement and spiritual support through prayer and compassionate presence.    Electronically signed by Chaplain TOBIN, on 2/11/2024 at 9:16 PM.  Spiritual Care Department  [facility]  [phone]    
Patient attended 1 group, wrap up with Nurse PM.   
Psychotherapy group 1330-Pt did not attend group.   
Pt argumentative with peer and staff. Stated \"I will only stay calm if you give me a warm blanket\". Pt continued to escalate and is now requesting to be transferred to a different unit. Pt advised to talk with doctor today. Pt is pacing in the dining room.  
Pt asked to speak to a . He was having bipolar issues. He asked lots of questions and they were dealt with. He was encouraged and blessed. It was highly appreciated.    02/06/24 1047   Encounter Summary   Encounter Overview/Reason  Initial Encounter   Service Provided For: Patient   Referral/Consult From: [facility]   Support System Family members   Last Encounter  02/06/24   Complexity of Encounter Moderate   Begin Time 0810   End Time  0845   Total Time Calculated 35 min   Spiritual/Emotional needs   Type Spiritual Support   Assessment/Intervention/Outcome   Assessment Calm   Intervention Empowerment   Outcome Engaged in conversation;Encouraged;Expressed feelings, needs, and concerns;Acceptance        
Pt attended group and his goal was to \"take medications and remain calm\"  
Pt did not attend group and he did not set a goal  
PRN  traZODone (DESYREL) tablet 50 mg, 50 mg, Oral, Nightly PRN  magnesium hydroxide (MILK OF MAGNESIA) 400 MG/5ML suspension 30 mL, 30 mL, Oral, Daily PRN  cariprazine hcl (VRAYLAR) capsule 3 mg, 3 mg, Oral, Daily    ASSESSMENT  Schizoaffective disorder, bipolar type (HCC)     PLAN  Patient's symptoms show no change  Continue with current medications; discontinue hydroxyzine HCL, start hydroxyzine pamoate.  Attempt to develop insight  Psycho-education conducted.  Supportive Therapy conducted.    
bipolar type (HCC)     PLAN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

## 2024-02-12 NOTE — DISCHARGE INSTRUCTIONS
Keep all follow-up appointments and take medications as directed.     Call the hope line if needed at :  [facility] [phone].  [facility] [phone].  [place] [phone].  [facility] [phone].  [facility] [phone].  [facility] [phone]    Symptoms to report to your Doctor:  Depression  Inability to eat, sleep, or have a bowel movement  Increased sleepiness and lethargy  Voices in your head  Any thoughts of harming self or others    Things to avoid:  Caffeine  Alcohol  No street drugs  Over the counter medications unless Ok'd by your physician or pharmacist.  Driving or operating machinery until full effects of your medications are known.  Driving or operating machinery if dizzy or drowsy from medications.  Use journal as directed.    Education:  Illness and medication teaching was completed.    Discharge Disposition: Patient was discharged to home and was transported by private vehicle. Patient was accompanied by family.      Information sent to next level of care:    ___x_Discharge instructions    [facility] Hotline:  [phone]    Crisis phone numbers:  [facility] [phone].  [facility] [phone]  [phone].  [facility] [phone].  [facility] [phone].  [facility] [phone].    [facility]  [address], [place] [address]  [phone]    [facility]  [address] [place], [place] [address] [place], [place] [address] [phone]    [facility]  [address] [address] [place] [address]  [phone]    [facility]  [address] [place] [address]

## 2024-02-12 NOTE — DISCHARGE SUMMARY
50 MG tablet Take 1 tablet by mouth nightly as needed for Sleep  Qty: 30 tablet, Refills: 0      cariprazine hcl (VRAYLAR) 4.5 MG CAPS capsule Take 1 capsule by mouth daily  Qty: 30 capsule, Refills: 0           CONTINUE these medications which have CHANGED    Details   hydrOXYzine pamoate (VISTARIL) 50 MG capsule Take 1 capsule by mouth 4 times daily as needed for Itching  Qty: 90 capsule, Refills: 0      benztropine (COGENTIN) 0.5 MG tablet Take 1 tablet by mouth daily  Qty: 60 tablet, Refills: 0           STOP taking these medications       FLUoxetine (PROZAC) 10 MG capsule Comments:   Reason for Stopping:         naltrexone (DEPADE) 50 MG tablet Comments:   Reason for Stopping:         risperiDONE (RISPERDAL) 1 MG tablet Comments:   Reason for Stopping:         prazosin (MINIPRESS) 2 MG capsule Comments:   Reason for Stopping:         propranolol (INDERAL) 20 MG tablet Comments:   Reason for Stopping:         paliperidone palmitate (INVEGA SUSTENNA) 156 MG/ML SUSP IM injection Comments:   Reason for Stopping:                    MENTAL STATUS EXAMINATION AT DISCHARGE: Patient is cooperative. Speech: Speech: Normal rate and tone but loud.  No abnormal movements, tics or mannerisms.  Mood euthymic; affect appropriate. Suicidal ideation Absent.  Homicidal ideations Absent.   Hallucinations Absent.  Delusions Absent. Thought Content: Normal. Thought Processes: Goal oriented. Alert and oriented X 3. Attention and concentration fair. MEMORY intact.  Insight and Judgement limited.     Disposition: Home    Patient Instructions:   Activity: As tolerated  Diet: regular diet    Follow-up as scheduled with [facility]      Time Spent on discharge in examination, evaluation, counseling and review of medications and discharge plan: More than 30 minutes.    Engagement: Patient displayed a good level of engagement with the treatments offered during this admission.       Signed:  Electronically signed by Jayme FARRAR

## 2024-07-10 ENCOUNTER — HOSPITAL ENCOUNTER (OUTPATIENT)
Age: 24
Setting detail: OBSERVATION
Discharge: HOME | End: 2024-07-11
Attending: STUDENT IN AN ORGANIZED HEALTH CARE EDUCATION/TRAINING PROGRAM
Payer: MEDICARE

## 2024-07-10 ENCOUNTER — APPOINTMENT (OUTPATIENT)
Age: 24
End: 2024-07-10
Payer: MEDICARE

## 2024-07-10 ENCOUNTER — APPOINTMENT (OUTPATIENT)
Dept: GENERAL RADIOLOGY | Age: 24
End: 2024-07-10
Payer: MEDICARE

## 2024-07-10 ENCOUNTER — APPOINTMENT (OUTPATIENT)
Dept: CT IMAGING | Age: 24
End: 2024-07-10
Payer: MEDICARE

## 2024-07-10 DIAGNOSIS — R07.9 CHEST PAIN, UNSPECIFIED TYPE: ICD-10-CM
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
## 2024-07-10 DIAGNOSIS — R07.89 CHEST WALL PAIN: ICD-10-CM

## 2024-07-10 DIAGNOSIS — M79.604 LEG PAIN, ANTERIOR, RIGHT: ICD-10-CM

## 2024-07-10 PROBLEM — R79.89 ELEVATED TROPONIN: Status: ACTIVE | Noted: 2024-07-10

## 2024-07-10 LAB
ALBUMIN SERPL BCG-MCNC: 4.2 G/DL (ref 3.5–5.1)
ALP SERPL-CCNC: 87 U/L (ref 38–126)
ALT SERPL W/O P-5'-P-CCNC: 21 U/L (ref 11–66)
AMPHETAMINES UR QL SCN: NEGATIVE
ANION GAP SERPL CALC-SCNC: 14 MEQ/L (ref 8–16)
AST SERPL-CCNC: 20 U/L (ref 5–40)
BACTERIA URNS QL MICRO: ABNORMAL /HPF
BARBITURATES UR QL SCN: NEGATIVE
BASOPHILS ABSOLUTE: 0 THOU/MM3 (ref 0–0.1)
BASOPHILS NFR BLD AUTO: 0.5 %
BENZODIAZ UR QL SCN: NEGATIVE
BILIRUB SERPL-MCNC: 1.4 MG/DL (ref 0.3–1.2)
BILIRUB UR QL STRIP.AUTO: NEGATIVE
BUN SERPL-MCNC: 12 MG/DL (ref 7–22)
BZE UR QL SCN: NEGATIVE
CALCIUM SERPL-MCNC: 8.5 MG/DL (ref 8.5–10.5)
CANNABINOIDS UR QL SCN: NEGATIVE
CASTS #/AREA URNS LPF: ABNORMAL /LPF
CASTS 2: ABNORMAL /LPF
CHARACTER UR: ABNORMAL
CHLORIDE SERPL-SCNC: 105 MEQ/L (ref 98–111)
CK SERPL-CCNC: 131 U/L (ref 55–170)
CO2 SERPL-SCNC: 20 MEQ/L (ref 23–33)
COLOR, UA: YELLOW
CREAT SERPL-MCNC: 0.7 MG/DL (ref 0.4–1.2)
CRP SERPL-MCNC: 0.55 MG/DL (ref 0–1)
CRYSTALS URNS MICRO: ABNORMAL
D DIMER PPP IA.FEU-MCNC: 251 NG/ML FEU (ref 0–500)
DEPRECATED RDW RBC AUTO: 38 FL (ref 35–45)
ECHO AR MAX VEL PISA: 4.5 M/S
ECHO AV CUSP MM: 2.1 CM
ECHO AV PEAK GRADIENT: 10 MMHG
ECHO AV PEAK VELOCITY: 1.6 M/S
ECHO AV REGURGITANT PHT: 291 MS
ECHO AV VELOCITY RATIO: 0.56
ECHO BSA: 1.89 M2
ECHO LA AREA 2C: 11.4 CM2
ECHO LA AREA 4C: 13.3 CM2
ECHO LA DIAMETER INDEX: 1.57 CM/M2
ECHO LA DIAMETER: 2.9 CM
ECHO LA MAJOR AXIS: 4.1 CM
ECHO LA MINOR AXIS: 3.8 CM
ECHO LA VOL BP: 31 ML (ref 18–58)
ECHO LA VOL MOD A2C: 27 ML (ref 18–58)
ECHO LA VOL MOD A4C: 33 ML (ref 18–58)
ECHO LA VOL/BSA BIPLANE: 17 ML/M2 (ref 16–34)
ECHO LA VOLUME INDEX MOD A2C: 15 ML/M2 (ref 16–34)
ECHO LA VOLUME INDEX MOD A4C: 18 ML/M2 (ref 16–34)
ECHO LV E' LATERAL VELOCITY: 14 CM/S
ECHO LV E' SEPTAL VELOCITY: 13 CM/S
ECHO LV EDV A2C: 112 ML
ECHO LV EDV A4C: 129 ML
ECHO LV EDV INDEX A4C: 70 ML/M2
ECHO LV EDV NDEX A2C: 61 ML/M2
ECHO LV EJECTION FRACTION A2C: 52 %
ECHO LV EJECTION FRACTION A4C: 51 %
ECHO LV EJECTION FRACTION BIPLANE: 50 % (ref 55–100)
ECHO LV ESV A2C: 54 ML
ECHO LV ESV A4C: 64 ML
ECHO LV ESV INDEX A2C: 29 ML/M2
ECHO LV ESV INDEX A4C: 35 ML/M2
ECHO LV FRACTIONAL SHORTENING: 26 % (ref 28–44)
ECHO LV INTERNAL DIMENSION DIASTOLE INDEX: 3.08 CM/M2
ECHO LV INTERNAL DIMENSION DIASTOLIC: 5.7 CM (ref 4.2–5.9)
ECHO LV INTERNAL DIMENSION SYSTOLIC INDEX: 2.27 CM/M2
ECHO LV INTERNAL DIMENSION SYSTOLIC: 4.2 CM
ECHO LV IVSD: 0.8 CM (ref 0.6–1)
ECHO LV MASS 2D: 144.3 G (ref 88–224)
ECHO LV MASS INDEX 2D: 78 G/M2 (ref 49–115)
ECHO LV POSTERIOR WALL DIASTOLIC: 0.6 CM (ref 0.6–1)
ECHO LV RELATIVE WALL THICKNESS RATIO: 0.21
ECHO LVOT PEAK GRADIENT: 3 MMHG
ECHO LVOT PEAK VELOCITY: 0.9 M/S
ECHO MV A VELOCITY: 0.67 M/S
ECHO MV E DECELERATION TIME (DT): 127 MS
ECHO MV E VELOCITY: 0.78 M/S
ECHO MV E/A RATIO: 1.16
ECHO MV E/E' LATERAL: 5.57
ECHO MV E/E' RATIO (AVERAGED): 5.79
ECHO MV E/E' SEPTAL: 6
ECHO PV MAX VELOCITY: 0.9 M/S
ECHO PV PEAK GRADIENT: 3 MMHG
ECHO RV INTERNAL DIMENSION: 1.9 CM
EKG ATRIAL RATE: 108 BPM
EKG ATRIAL RATE: 91 BPM
EKG P AXIS: 69 DEGREES
EKG P AXIS: 77 DEGREES
EKG P-R INTERVAL: 154 MS
EKG P-R INTERVAL: 156 MS
EKG Q-T INTERVAL: 308 MS
EKG Q-T INTERVAL: 346 MS
EKG QRS DURATION: 76 MS
EKG QRS DURATION: 82 MS
EKG QTC CALCULATION (BAZETT): 412 MS
EKG QTC CALCULATION (BAZETT): 425 MS
EKG R AXIS: 67 DEGREES
EKG R AXIS: 69 DEGREES
EKG T AXIS: 44 DEGREES
EKG T AXIS: 51 DEGREES
EKG VENTRICULAR RATE: 108 BPM
EKG VENTRICULAR RATE: 91 BPM
EOSINOPHIL NFR BLD AUTO: 2.1 %
EOSINOPHILS ABSOLUTE: 0.2 THOU/MM3 (ref 0–0.4)
EPITHELIAL CELLS, UA: ABNORMAL /HPF
ERYTHROCYTE [DISTWIDTH] IN BLOOD BY AUTOMATED COUNT: 12.4 % (ref 11.5–14.5)
ERYTHROCYTE [SEDIMENTATION RATE] IN BLOOD BY WESTERGREN METHOD: 2 MM/HR (ref 0–10)
FENTANYL: NEGATIVE
GFR SERPL CREATININE-BSD FRML MDRD: > 90 ML/MIN/1.73M2
GLUCOSE SERPL-MCNC: 101 MG/DL (ref 70–108)
GLUCOSE UR QL STRIP.AUTO: NEGATIVE MG/DL
HCT VFR BLD AUTO: 42.5 % (ref 42–52)
HGB BLD-MCNC: 15 GM/DL (ref 14–18)
HGB UR QL STRIP.AUTO: NEGATIVE
IMM GRANULOCYTES # BLD AUTO: 0.02 THOU/MM3 (ref 0–0.07)
IMM GRANULOCYTES NFR BLD AUTO: 0.3 %
KETONES UR QL STRIP.AUTO: NEGATIVE
LIPASE SERPL-CCNC: 24 U/L (ref 5.6–51.3)
LYMPHOCYTES ABSOLUTE: 1.4 THOU/MM3 (ref 1–4.8)
LYMPHOCYTES NFR BLD AUTO: 18.4 %
MAGNESIUM SERPL-MCNC: 1.9 MG/DL (ref 1.6–2.4)
MCH RBC QN AUTO: 30.1 PG (ref 26–33)
MCHC RBC AUTO-ENTMCNC: 35.3 GM/DL (ref 32.2–35.5)
MCV RBC AUTO: 85.2 FL (ref 80–94)
MISCELLANEOUS 2: ABNORMAL
MONOCYTES ABSOLUTE: 0.8 THOU/MM3 (ref 0.4–1.3)
MONOCYTES NFR BLD AUTO: 10.1 %
NEUTROPHILS ABSOLUTE: 5.1 THOU/MM3 (ref 1.8–7.7)
NEUTROPHILS NFR BLD AUTO: 68.6 %
NITRITE UR QL STRIP: NEGATIVE
NRBC BLD AUTO-RTO: 0 /100 WBC
OPIATES UR QL SCN: NEGATIVE
OSMOLALITY SERPL CALC.SUM OF ELEC: 277.4 MOSMOL/KG (ref 275–300)
OXYCODONE: NEGATIVE
PH UR STRIP.AUTO: 7.5 [PH] (ref 5–9)
PHENCYCLIDINE QUANTITATIVE URINE: NEGATIVE
PLATELET # BLD AUTO: 237 THOU/MM3 (ref 130–400)
PMV BLD AUTO: 9 FL (ref 9.4–12.4)
POTASSIUM SERPL-SCNC: 3.8 MEQ/L (ref 3.5–5.2)
PROT SERPL-MCNC: 6.7 G/DL (ref 6.1–8)
PROT UR STRIP.AUTO-MCNC: NEGATIVE MG/DL
RBC # BLD AUTO: 4.99 MILL/MM3 (ref 4.7–6.1)
RBC URINE: ABNORMAL /HPF
RENAL EPI CELLS #/AREA URNS HPF: ABNORMAL /[HPF]
SODIUM SERPL-SCNC: 139 MEQ/L (ref 135–145)
SP GR UR REFRACT.AUTO: 1.03 (ref 1–1.03)
TROPONIN, HIGH SENSITIVITY: 113 NG/L (ref 0–12)
TROPONIN, HIGH SENSITIVITY: 27 NG/L (ref 0–12)
TROPONIN, HIGH SENSITIVITY: 44 NG/L (ref 0–12)
TROPONIN, HIGH SENSITIVITY: 45 NG/L (ref 0–12)
TROPONIN, HIGH SENSITIVITY: 60 NG/L (ref 0–12)
TROPONIN, HIGH SENSITIVITY: 72 NG/L (ref 0–12)
UROBILINOGEN, URINE: 1 EU/DL (ref 0–1)
WBC # BLD AUTO: 7.5 THOU/MM3 (ref 4.8–10.8)
WBC #/AREA URNS HPF: ABNORMAL /HPF
WBC #/AREA URNS HPF: NEGATIVE /[HPF]
YEAST LIKE FUNGI URNS QL MICRO: ABNORMAL

## 2024-07-10 PROCEDURE — 80305 DRUG TEST PRSMV DIR OPT OBS: CPT

## 2024-07-10 PROCEDURE — 96374 THER/PROPH/DIAG INJ IV PUSH: CPT

## 2024-07-10 PROCEDURE — 6360000004 HC RX CONTRAST MEDICATION: Performed by: STUDENT IN AN ORGANIZED HEALTH CARE EDUCATION/TRAINING PROGRAM

## 2024-07-10 PROCEDURE — 2580000003 HC RX 258

## 2024-07-10 PROCEDURE — G0378 HOSPITAL OBSERVATION PER HR: HCPCS

## 2024-07-10 PROCEDURE — 93005 ELECTROCARDIOGRAM TRACING: CPT | Performed by: STUDENT IN AN ORGANIZED HEALTH CARE EDUCATION/TRAINING PROGRAM

## 2024-07-10 PROCEDURE — 36415 COLL VENOUS BLD VENIPUNCTURE: CPT

## 2024-07-10 PROCEDURE — 83735 ASSAY OF MAGNESIUM: CPT

## 2024-07-10 PROCEDURE — 6370000000 HC RX 637 (ALT 250 FOR IP)

## 2024-07-10 PROCEDURE — 99223 1ST HOSP IP/OBS HIGH 75: CPT | Performed by: NUCLEAR MEDICINE

## 2024-07-10 PROCEDURE — 80053 COMPREHEN METABOLIC PANEL: CPT

## 2024-07-10 PROCEDURE — 96372 THER/PROPH/DIAG INJ SC/IM: CPT

## 2024-07-10 PROCEDURE — 81001 URINALYSIS AUTO W/SCOPE: CPT

## 2024-07-10 PROCEDURE — 93306 TTE W/DOPPLER COMPLETE: CPT | Performed by: NUCLEAR MEDICINE

## 2024-07-10 PROCEDURE — 85651 RBC SED RATE NONAUTOMATED: CPT

## 2024-07-10 PROCEDURE — 71046 X-RAY EXAM CHEST 2 VIEWS: CPT

## 2024-07-10 PROCEDURE — 6360000002 HC RX W HCPCS

## 2024-07-10 PROCEDURE — 82550 ASSAY OF CK (CPK): CPT

## 2024-07-10 PROCEDURE — 86140 C-REACTIVE PROTEIN: CPT

## 2024-07-10 PROCEDURE — 96361 HYDRATE IV INFUSION ADD-ON: CPT

## 2024-07-10 PROCEDURE — 93306 TTE W/DOPPLER COMPLETE: CPT

## 2024-07-10 PROCEDURE — 93010 ELECTROCARDIOGRAM REPORT: CPT | Performed by: INTERNAL MEDICINE

## 2024-07-10 PROCEDURE — 84484 ASSAY OF TROPONIN QUANT: CPT

## 2024-07-10 PROCEDURE — 83690 ASSAY OF LIPASE: CPT

## 2024-07-10 PROCEDURE — 73552 X-RAY EXAM OF FEMUR 2/>: CPT

## 2024-07-10 PROCEDURE — 71260 CT THORAX DX C+: CPT

## 2024-07-10 PROCEDURE — 99285 EMERGENCY DEPT VISIT HI MDM: CPT

## 2024-07-10 PROCEDURE — 85379 FIBRIN DEGRADATION QUANT: CPT

## 2024-07-10 PROCEDURE — 6370000000 HC RX 637 (ALT 250 FOR IP): Performed by: STUDENT IN AN ORGANIZED HEALTH CARE EDUCATION/TRAINING PROGRAM

## 2024-07-10 PROCEDURE — 85025 COMPLETE CBC W/AUTO DIFF WBC: CPT

## 2024-07-10 PROCEDURE — 93005 ELECTROCARDIOGRAM TRACING: CPT

## 2024-07-10 PROCEDURE — 96375 TX/PRO/DX INJ NEW DRUG ADDON: CPT

## 2024-07-10 RX ORDER — POTASSIUM CHLORIDE 7.45 MG/ML
10 INJECTION INTRAVENOUS PRN
Status: DISCONTINUED | OUTPATIENT
Start: 2024-07-10 | End: 2024-07-11 | Stop reason: HOSPADM

## 2024-07-10 RX ORDER — HYDROXYZINE PAMOATE 25 MG/1
50 CAPSULE ORAL 4 TIMES DAILY PRN
Status: DISCONTINUED | OUTPATIENT
Start: 2024-07-10 | End: 2024-07-11 | Stop reason: HOSPADM

## 2024-07-10 RX ORDER — SODIUM CHLORIDE 0.9 % (FLUSH) 0.9 %
5-40 SYRINGE (ML) INJECTION EVERY 12 HOURS SCHEDULED
Status: DISCONTINUED | OUTPATIENT
Start: 2024-07-10 | End: 2024-07-11 | Stop reason: HOSPADM

## 2024-07-10 RX ORDER — ONDANSETRON 4 MG/1
4 TABLET, ORALLY DISINTEGRATING ORAL EVERY 8 HOURS PRN
Status: DISCONTINUED | OUTPATIENT
Start: 2024-07-10 | End: 2024-07-11 | Stop reason: HOSPADM

## 2024-07-10 RX ORDER — SODIUM CHLORIDE 9 MG/ML
INJECTION, SOLUTION INTRAVENOUS PRN
Status: DISCONTINUED | OUTPATIENT
Start: 2024-07-10 | End: 2024-07-11 | Stop reason: HOSPADM

## 2024-07-10 RX ORDER — TRAMADOL HYDROCHLORIDE 50 MG/1
50 TABLET ORAL EVERY 6 HOURS PRN
Status: DISCONTINUED | OUTPATIENT
Start: 2024-07-10 | End: 2024-07-11 | Stop reason: HOSPADM

## 2024-07-10 RX ORDER — POTASSIUM CHLORIDE 20 MEQ/1
40 TABLET, EXTENDED RELEASE ORAL PRN
Status: DISCONTINUED | OUTPATIENT
Start: 2024-07-10 | End: 2024-07-11 | Stop reason: HOSPADM

## 2024-07-10 RX ORDER — KETOROLAC TROMETHAMINE 30 MG/ML
15 INJECTION, SOLUTION INTRAMUSCULAR; INTRAVENOUS ONCE
Status: COMPLETED | OUTPATIENT
Start: 2024-07-10 | End: 2024-07-10

## 2024-07-10 RX ORDER — ACETAMINOPHEN 650 MG/1
650 SUPPOSITORY RECTAL EVERY 6 HOURS PRN
Status: DISCONTINUED | OUTPATIENT
Start: 2024-07-10 | End: 2024-07-11 | Stop reason: HOSPADM

## 2024-07-10 RX ORDER — ACETAMINOPHEN 325 MG/1
650 TABLET ORAL EVERY 6 HOURS PRN
Status: DISCONTINUED | OUTPATIENT
Start: 2024-07-10 | End: 2024-07-11 | Stop reason: HOSPADM

## 2024-07-10 RX ORDER — 0.9 % SODIUM CHLORIDE 0.9 %
1000 INTRAVENOUS SOLUTION INTRAVENOUS ONCE
Status: COMPLETED | OUTPATIENT
Start: 2024-07-10 | End: 2024-07-10

## 2024-07-10 RX ORDER — ENOXAPARIN SODIUM 100 MG/ML
40 INJECTION SUBCUTANEOUS DAILY
Status: DISCONTINUED | OUTPATIENT
Start: 2024-07-10 | End: 2024-07-11 | Stop reason: HOSPADM

## 2024-07-10 RX ORDER — SENNOSIDES A AND B 8.6 MG/1
1 TABLET, FILM COATED ORAL DAILY PRN
Status: DISCONTINUED | OUTPATIENT
Start: 2024-07-10 | End: 2024-07-11 | Stop reason: HOSPADM

## 2024-07-10 RX ORDER — SODIUM CHLORIDE, SODIUM LACTATE, POTASSIUM CHLORIDE, CALCIUM CHLORIDE 600; 310; 30; 20 MG/100ML; MG/100ML; MG/100ML; MG/100ML
INJECTION, SOLUTION INTRAVENOUS CONTINUOUS
Status: ACTIVE | OUTPATIENT
Start: 2024-07-10 | End: 2024-07-10

## 2024-07-10 RX ORDER — BENZTROPINE MESYLATE 1 MG/1
0.5 TABLET ORAL 2 TIMES DAILY
Status: DISCONTINUED | OUTPATIENT
Start: 2024-07-10 | End: 2024-07-11 | Stop reason: HOSPADM

## 2024-07-10 RX ORDER — MAGNESIUM SULFATE IN WATER 40 MG/ML
2000 INJECTION, SOLUTION INTRAVENOUS PRN
Status: DISCONTINUED | OUTPATIENT
Start: 2024-07-10 | End: 2024-07-11 | Stop reason: HOSPADM

## 2024-07-10 RX ORDER — PRAZOSIN HYDROCHLORIDE 2 MG/1
2 CAPSULE ORAL NIGHTLY
COMMUNITY

## 2024-07-10 RX ORDER — ONDANSETRON 2 MG/ML
4 INJECTION INTRAMUSCULAR; INTRAVENOUS EVERY 6 HOURS PRN
Status: DISCONTINUED | OUTPATIENT
Start: 2024-07-10 | End: 2024-07-11 | Stop reason: HOSPADM

## 2024-07-10 RX ORDER — KETOROLAC TROMETHAMINE 30 MG/ML
15 INJECTION, SOLUTION INTRAMUSCULAR; INTRAVENOUS EVERY 6 HOURS PRN
Status: DISCONTINUED | OUTPATIENT
Start: 2024-07-10 | End: 2024-07-10

## 2024-07-10 RX ORDER — PRAZOSIN HYDROCHLORIDE 1 MG/1
2 CAPSULE ORAL NIGHTLY
Status: DISCONTINUED | OUTPATIENT
Start: 2024-07-10 | End: 2024-07-11 | Stop reason: HOSPADM

## 2024-07-10 RX ORDER — PANTOPRAZOLE SODIUM 40 MG/10ML
40 INJECTION, POWDER, LYOPHILIZED, FOR SOLUTION INTRAVENOUS DAILY
Status: DISCONTINUED | OUTPATIENT
Start: 2024-07-10 | End: 2024-07-11 | Stop reason: HOSPADM

## 2024-07-10 RX ORDER — SODIUM CHLORIDE 0.9 % (FLUSH) 0.9 %
5-40 SYRINGE (ML) INJECTION PRN
Status: DISCONTINUED | OUTPATIENT
Start: 2024-07-10 | End: 2024-07-11 | Stop reason: HOSPADM

## 2024-07-10 RX ORDER — METHOCARBAMOL 500 MG/1
1000 TABLET, FILM COATED ORAL 3 TIMES DAILY
Status: DISCONTINUED | OUTPATIENT
Start: 2024-07-10 | End: 2024-07-11 | Stop reason: HOSPADM

## 2024-07-10 RX ADMIN — IOPAMIDOL 80 ML: 755 INJECTION, SOLUTION INTRAVENOUS at 06:19

## 2024-07-10 RX ADMIN — TRAMADOL HYDROCHLORIDE 50 MG: 50 TABLET ORAL at 10:34

## 2024-07-10 RX ADMIN — ALUMINUM HYDROXIDE, MAGNESIUM HYDROXIDE, AND SIMETHICONE: 1200; 120; 1200 SUSPENSION ORAL at 05:52

## 2024-07-10 RX ADMIN — METHOCARBAMOL 1000 MG: 500 TABLET ORAL at 22:10

## 2024-07-10 RX ADMIN — CARIPRAZINE 1.5 MG: 1.5 CAPSULE, GELATIN COATED ORAL at 23:11

## 2024-07-10 RX ADMIN — PANTOPRAZOLE SODIUM 40 MG: 40 INJECTION, POWDER, FOR SOLUTION INTRAVENOUS at 10:34

## 2024-07-10 RX ADMIN — SODIUM CHLORIDE 1000 ML: 9 INJECTION, SOLUTION INTRAVENOUS at 06:03

## 2024-07-10 RX ADMIN — ALUMINUM HYDROXIDE, MAGNESIUM HYDROXIDE, AND SIMETHICONE: 1200; 120; 1200 SUSPENSION ORAL at 15:53

## 2024-07-10 RX ADMIN — SODIUM CHLORIDE, PRESERVATIVE FREE 10 ML: 5 INJECTION INTRAVENOUS at 17:06

## 2024-07-10 RX ADMIN — SODIUM CHLORIDE, POTASSIUM CHLORIDE, SODIUM LACTATE AND CALCIUM CHLORIDE: 600; 310; 30; 20 INJECTION, SOLUTION INTRAVENOUS at 14:02

## 2024-07-10 RX ADMIN — PRAZOSIN HYDROCHLORIDE 2 MG: 1 CAPSULE ORAL at 22:10

## 2024-07-10 RX ADMIN — METHOCARBAMOL 1000 MG: 500 TABLET ORAL at 17:06

## 2024-07-10 RX ADMIN — BENZTROPINE MESYLATE 0.5 MG: 1 TABLET ORAL at 22:10

## 2024-07-10 RX ADMIN — LIDOCAINE HYDROCHLORIDE: 20 SOLUTION ORAL at 22:10

## 2024-07-10 RX ADMIN — ENOXAPARIN SODIUM 40 MG: 100 INJECTION SUBCUTANEOUS at 15:52

## 2024-07-10 RX ADMIN — KETOROLAC TROMETHAMINE 15 MG: 30 INJECTION, SOLUTION INTRAMUSCULAR at 05:20

## 2024-07-10 RX ADMIN — ACETAMINOPHEN 650 MG: 325 TABLET ORAL at 23:13

## 2024-07-10 ASSESSMENT — PAIN DESCRIPTION - DESCRIPTORS
DESCRIPTORS: ACHING
DESCRIPTORS: TIGHTNESS

## 2024-07-10 ASSESSMENT — HEART SCORE: ECG: NORMAL

## 2024-07-10 ASSESSMENT — PAIN - FUNCTIONAL ASSESSMENT
PAIN_FUNCTIONAL_ASSESSMENT: 0-10
PAIN_FUNCTIONAL_ASSESSMENT: NONE - DENIES PAIN
PAIN_FUNCTIONAL_ASSESSMENT: ACTIVITIES ARE NOT PREVENTED
PAIN_FUNCTIONAL_ASSESSMENT: 0-10
PAIN_FUNCTIONAL_ASSESSMENT: NONE - DENIES PAIN

## 2024-07-10 ASSESSMENT — PAIN DESCRIPTION - LOCATION
LOCATION: HEAD
LOCATION: OTHER (COMMENT)

## 2024-07-10 ASSESSMENT — PAIN SCALES - GENERAL
PAINLEVEL_OUTOF10: 0
PAINLEVEL_OUTOF10: 4
PAINLEVEL_OUTOF10: 8
PAINLEVEL_OUTOF10: 3
PAINLEVEL_OUTOF10: 3

## 2024-07-10 ASSESSMENT — PAIN DESCRIPTION - ORIENTATION
ORIENTATION: RIGHT
ORIENTATION: RIGHT;LEFT

## 2024-07-10 ASSESSMENT — LIFESTYLE VARIABLES: HOW OFTEN DO YOU HAVE A DRINK CONTAINING ALCOHOL: NEVER

## 2024-07-10 NOTE — PROCEDURES
PROCEDURE NOTE  Date: 7/10/2024   Name: Corey Webster  YOB: 2000    Procedures  12 lead EKG completed. Results handed to Jeanette Barnes CET

## 2024-07-10 NOTE — H&P
28.46 kg/m²   General appearance: No apparent distress, appears stated age. On room air, no acute respiratory distress. Patient is resting comfortably in the ED. No apparent ecchymosis or abrasion seen   Eyes: Pupils equal, round, and reactive to light. Conjunctivae/corneas clear.  HENT: Head normal in appearance. External nares normal. Oral mucosa moist without lesions.  Hearing grossly intact.  Neck: Supple, with full range of motion. Trachea midline. No gross JVD appreciated.  Respiratory:  Normal respiratory effort. Clear to auscultation, bilaterally without rales or wheezes or rhonchi.  Cardiovascular: Normal rate, regular rhythm with normal S1/S2 without murmurs.  No lower extremity edema.   CHEST - tenderness to palpation of anterior chest wall.   Abdomen: Soft, non-tender, non-distended with normal bowel sounds.  Musculoskeletal: No joint swelling or tenderness. Normal tone. No abnormal movements. Some tenderness to palpation of right proximal anterior lower extremity. Soft to palpation. No ecchymosis, no abrasion.   Skin: Warm and dry. No rashes or lesions.  Neurologic:  No focal sensory/motor deficits in the upper and lower extremities. Cranial nerves:  grossly non-focal 2-12.     Psychiatric: Alert and oriented, normal insight and thought content.   Capillary Refill: Brisk,< 3 seconds.  Peripheral Pulses: +2 palpable, equal bilaterally.     Medications Prior to Admission:   Prior to Admission Medications   Prescriptions Last Dose Informant Patient Reported? Taking?   benztropine (COGENTIN) 0.5 MG tablet   No No   Sig: Take 1 tablet by mouth daily   cariprazine hcl (VRAYLAR) 4.5 MG CAPS capsule   No No   Sig: Take 1 capsule by mouth daily   hydrOXYzine pamoate (VISTARIL) 50 MG capsule   No No   Sig: Take 1 capsule by mouth 4 times daily as needed for Itching   prazosin (MINIPRESS) 2 MG capsule   Yes Yes   Sig: Take 1 capsule by mouth nightly   traZODone (DESYREL) 50 MG tablet   No No   Sig: Take 1 tablet
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
## 2024-07-10 NOTE — ED NOTES
ECHO at bedside. Pt not tolerating ECHO well. Pt yelling at times but states \"I'm okay\". Pt reports  feeling \"bad vibe\" in the room. Dr. Grace notified of trop increase through [facility].
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
## 2024-07-11 ENCOUNTER — APPOINTMENT (OUTPATIENT)
Dept: INTERVENTIONAL RADIOLOGY/VASCULAR | Age: 24
End: 2024-07-11
Payer: MEDICARE

## 2024-07-11 VITALS
WEIGHT: 171 LBS | BODY MASS INDEX: 28.49 KG/M2 | SYSTOLIC BLOOD PRESSURE: 126 MMHG | DIASTOLIC BLOOD PRESSURE: 72 MMHG | HEIGHT: 65 IN | TEMPERATURE: 97.5 F | HEART RATE: 93 BPM | OXYGEN SATURATION: 97 % | RESPIRATION RATE: 16 BRPM

## 2024-07-11 PROBLEM — M79.604 RIGHT LEG PAIN: Status: ACTIVE | Noted: 2024-07-11

## 2024-07-11 PROBLEM — K21.00 GASTROESOPHAGEAL REFLUX DISEASE WITH ESOPHAGITIS WITHOUT HEMORRHAGE: Status: ACTIVE | Noted: 2024-07-11

## 2024-07-11 LAB
ANION GAP SERPL CALC-SCNC: 9 MEQ/L (ref 8–16)
BASOPHILS ABSOLUTE: 0 THOU/MM3 (ref 0–0.1)
BASOPHILS NFR BLD AUTO: 0.5 %
BUN SERPL-MCNC: 10 MG/DL (ref 7–22)
CALCIUM SERPL-MCNC: 8.7 MG/DL (ref 8.5–10.5)
CHLORIDE SERPL-SCNC: 105 MEQ/L (ref 98–111)
CO2 SERPL-SCNC: 26 MEQ/L (ref 23–33)
CREAT SERPL-MCNC: 0.9 MG/DL (ref 0.4–1.2)
DEPRECATED RDW RBC AUTO: 39.2 FL (ref 35–45)
ECHO BSA: 1.89 M2
EKG ATRIAL RATE: 97 BPM
EKG P AXIS: 65 DEGREES
EKG P-R INTERVAL: 152 MS
EKG Q-T INTERVAL: 338 MS
EKG QRS DURATION: 86 MS
EKG QTC CALCULATION (BAZETT): 429 MS
EKG R AXIS: 75 DEGREES
EKG T AXIS: 37 DEGREES
EKG VENTRICULAR RATE: 97 BPM
EOSINOPHIL NFR BLD AUTO: 1.8 %
EOSINOPHILS ABSOLUTE: 0.1 THOU/MM3 (ref 0–0.4)
ERYTHROCYTE [DISTWIDTH] IN BLOOD BY AUTOMATED COUNT: 12.5 % (ref 11.5–14.5)
GFR SERPL CREATININE-BSD FRML MDRD: > 90 ML/MIN/1.73M2
GLUCOSE SERPL-MCNC: 95 MG/DL (ref 70–108)
HCT VFR BLD AUTO: 44.3 % (ref 42–52)
HGB BLD-MCNC: 15.2 GM/DL (ref 14–18)
IMM GRANULOCYTES # BLD AUTO: 0.02 THOU/MM3 (ref 0–0.07)
IMM GRANULOCYTES NFR BLD AUTO: 0.5 %
LYMPHOCYTES ABSOLUTE: 1.2 THOU/MM3 (ref 1–4.8)
LYMPHOCYTES NFR BLD AUTO: 27.5 %
MCH RBC QN AUTO: 29.7 PG (ref 26–33)
MCHC RBC AUTO-ENTMCNC: 34.3 GM/DL (ref 32.2–35.5)
MCV RBC AUTO: 86.5 FL (ref 80–94)
MONOCYTES ABSOLUTE: 0.7 THOU/MM3 (ref 0.4–1.3)
MONOCYTES NFR BLD AUTO: 15.9 %
NEUTROPHILS ABSOLUTE: 2.4 THOU/MM3 (ref 1.8–7.7)
NEUTROPHILS NFR BLD AUTO: 53.8 %
NRBC BLD AUTO-RTO: 0 /100 WBC
PLATELET # BLD AUTO: 219 THOU/MM3 (ref 130–400)
PMV BLD AUTO: 8.9 FL (ref 9.4–12.4)
POTASSIUM SERPL-SCNC: 4.2 MEQ/L (ref 3.5–5.2)
RBC # BLD AUTO: 5.12 MILL/MM3 (ref 4.7–6.1)
SODIUM SERPL-SCNC: 140 MEQ/L (ref 135–145)
WBC # BLD AUTO: 4.4 THOU/MM3 (ref 4.8–10.8)

## 2024-07-11 PROCEDURE — 6370000000 HC RX 637 (ALT 250 FOR IP)

## 2024-07-11 PROCEDURE — 99232 SBSQ HOSP IP/OBS MODERATE 35: CPT | Performed by: PHYSICIAN ASSISTANT

## 2024-07-11 PROCEDURE — 85025 COMPLETE CBC W/AUTO DIFF WBC: CPT

## 2024-07-11 PROCEDURE — 93005 ELECTROCARDIOGRAM TRACING: CPT

## 2024-07-11 PROCEDURE — 2580000003 HC RX 258

## 2024-07-11 PROCEDURE — 96376 TX/PRO/DX INJ SAME DRUG ADON: CPT

## 2024-07-11 PROCEDURE — 80048 BASIC METABOLIC PNL TOTAL CA: CPT

## 2024-07-11 PROCEDURE — 36415 COLL VENOUS BLD VENIPUNCTURE: CPT

## 2024-07-11 PROCEDURE — 93010 ELECTROCARDIOGRAM REPORT: CPT | Performed by: INTERNAL MEDICINE

## 2024-07-11 PROCEDURE — 6360000002 HC RX W HCPCS

## 2024-07-11 PROCEDURE — G0378 HOSPITAL OBSERVATION PER HR: HCPCS

## 2024-07-11 PROCEDURE — 93971 EXTREMITY STUDY: CPT

## 2024-07-11 RX ORDER — PANTOPRAZOLE SODIUM 40 MG/1
40 TABLET, DELAYED RELEASE ORAL
Qty: 30 TABLET | Refills: 0 | Status: SHIPPED | OUTPATIENT
Start: 2024-07-11 | End: 2024-08-10

## 2024-07-11 RX ORDER — KETOROLAC TROMETHAMINE 10 MG/1
10 TABLET, FILM COATED ORAL EVERY 6 HOURS PRN
Qty: 20 TABLET | Refills: 0 | Status: SHIPPED | OUTPATIENT
Start: 2024-07-11 | End: 2024-07-25

## 2024-07-11 RX ORDER — PANTOPRAZOLE SODIUM 40 MG/10ML
40 INJECTION, POWDER, LYOPHILIZED, FOR SOLUTION INTRAVENOUS
Qty: 30 EACH | Refills: 0 | Status: SHIPPED | OUTPATIENT
Start: 2024-07-11 | End: 2024-07-11 | Stop reason: HOSPADM

## 2024-07-11 RX ORDER — TRAMADOL HYDROCHLORIDE 50 MG/1
50 TABLET ORAL EVERY 6 HOURS PRN
Qty: 4 TABLET | Refills: 0 | Status: SHIPPED | OUTPATIENT
Start: 2024-07-11 | End: 2024-07-12

## 2024-07-11 RX ADMIN — METHOCARBAMOL 1000 MG: 500 TABLET ORAL at 10:29

## 2024-07-11 RX ADMIN — BENZTROPINE MESYLATE 0.5 MG: 1 TABLET ORAL at 10:29

## 2024-07-11 RX ADMIN — PANTOPRAZOLE SODIUM 40 MG: 40 INJECTION, POWDER, FOR SOLUTION INTRAVENOUS at 05:22

## 2024-07-11 RX ADMIN — SODIUM CHLORIDE, PRESERVATIVE FREE 10 ML: 5 INJECTION INTRAVENOUS at 10:30

## 2024-07-11 ASSESSMENT — PAIN DESCRIPTION - LOCATION
LOCATION: LEG
LOCATION: LEG

## 2024-07-11 ASSESSMENT — PAIN DESCRIPTION - FREQUENCY
FREQUENCY: INTERMITTENT
FREQUENCY: INTERMITTENT

## 2024-07-11 ASSESSMENT — PAIN DESCRIPTION - ORIENTATION
ORIENTATION: RIGHT
ORIENTATION: RIGHT

## 2024-07-11 ASSESSMENT — PAIN DESCRIPTION - PAIN TYPE
TYPE: ACUTE PAIN
TYPE: ACUTE PAIN

## 2024-07-11 ASSESSMENT — PAIN DESCRIPTION - ONSET
ONSET: ON-GOING
ONSET: ON-GOING

## 2024-07-11 ASSESSMENT — PAIN DESCRIPTION - DESCRIPTORS
DESCRIPTORS: SPASM;PRESSURE
DESCRIPTORS: PRESSURE

## 2024-07-11 ASSESSMENT — PAIN SCALES - GENERAL
PAINLEVEL_OUTOF10: 2
PAINLEVEL_OUTOF10: 4

## 2024-07-11 ASSESSMENT — PAIN - FUNCTIONAL ASSESSMENT
PAIN_FUNCTIONAL_ASSESSMENT: ACTIVITIES ARE NOT PREVENTED
PAIN_FUNCTIONAL_ASSESSMENT: ACTIVITIES ARE NOT PREVENTED
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
## 2024-07-11 NOTE — PLAN OF CARE
Problem: Discharge Planning  Goal: Discharge to home or other facility with appropriate resources  7/11/2024 1442 by Lizy Santoyo RN  Outcome: Progressing  Flowsheets (Taken 7/11/2024 0154 by Tara Majano RN)  Discharge to home or other facility with appropriate resources:   Identify barriers to discharge with patient and caregiver   Identify discharge learning needs (meds, wound care, etc)   Arrange for needed discharge resources and transportation as appropriate  7/11/2024 0154 by Tara Majano RN  Outcome: Progressing  Flowsheets (Taken 7/11/2024 0154)  Discharge to home or other facility with appropriate resources:   Identify barriers to discharge with patient and caregiver   Identify discharge learning needs (meds, wound care, etc)   Arrange for needed discharge resources and transportation as appropriate     Problem: Pain  Goal: Verbalizes/displays adequate comfort level or baseline comfort level  7/11/2024 1442 by Lizy Santoyo RN  Outcome: Progressing  Flowsheets (Taken 7/11/2024 1015)  Verbalizes/displays adequate comfort level or baseline comfort level: Encourage patient to monitor pain and request assistance  7/11/2024 0154 by Tara Majano RN  Outcome: Progressing  Flowsheets (Taken 7/11/2024 0154)  Verbalizes/displays adequate comfort level or baseline comfort level:   Encourage patient to monitor pain and request assistance   Assess pain using appropriate pain scale   Implement non-pharmacological measures as appropriate and evaluate response   Administer analgesics based on type and severity of pain and evaluate response     Problem: Skin/Tissue Integrity  Goal: Absence of new skin breakdown  Description: 1.  Monitor for areas of redness and/or skin breakdown  2.  Assess vascular access sites hourly  3.  Every 4-6 hours minimum:  Change oxygen saturation probe site  4.  Every 4-6 hours:  If on nasal continuous positive airway pressure, respiratory therapy assess nares and determine need 
  Problem: Discharge Planning  Goal: Discharge to home or other facility with appropriate resources  Outcome: Progressing  Flowsheets (Taken 7/10/2024 1537)  Discharge to home or other facility with appropriate resources: Identify barriers to discharge with patient and caregiver     Problem: Pain  Goal: Verbalizes/displays adequate comfort level or baseline comfort level  Outcome: Progressing  Flowsheets (Taken 7/10/2024 1937)  Verbalizes/displays adequate comfort level or baseline comfort level:   Encourage patient to monitor pain and request assistance   Assess pain using appropriate pain scale     Problem: Skin/Tissue Integrity  Goal: Absence of new skin breakdown  Description: 1.  Monitor for areas of redness and/or skin breakdown  2.  Assess vascular access sites hourly  3.  Every 4-6 hours minimum:  Change oxygen saturation probe site  4.  Every 4-6 hours:  If on nasal continuous positive airway pressure, respiratory therapy assess nares and determine need for appliance change or resting period.  Outcome: Progressing  Note: Patient free from skin breakdown. Patient turns self and makes frequent positional changes. Will continue to monitor.      Problem: Respiratory - Adult  Goal: Achieves optimal ventilation and oxygenation  Outcome: Progressing  Flowsheets (Taken 7/10/2024 1938)  Achieves optimal ventilation and oxygenation:   Assess for changes in respiratory status   Assess for changes in mentation and behavior     Problem: Metabolic/Fluid and Electrolytes - Adult  Goal: Electrolytes maintained within normal limits  Outcome: Progressing  Flowsheets (Taken 7/10/2024 1938)  Electrolytes maintained within normal limits: Monitor labs and assess patient for signs and symptoms of electrolyte imbalances     
(Taken 7/11/2024 0154)  Electrolytes maintained within normal limits: Monitor labs and assess patient for signs and symptoms of electrolyte imbalances     Care plan reviewed with patient.  Patient verbalizes understanding of the care plan and contributed to goal setting.

## 2024-07-11 NOTE — PROCEDURES
PROCEDURE NOTE  Date: 7/11/2024   Name: Corey Webster  YOB: 2000    Procedures  12 lead EKG completed. Results placed in patients chart.
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
## 2024-07-16 ENCOUNTER — OFFICE VISIT (OUTPATIENT)
Dept: FAMILY MEDICINE CLINIC | Age: 24
End: 2024-07-16
Payer: MEDICARE

## 2024-07-16 VITALS
DIASTOLIC BLOOD PRESSURE: 62 MMHG | WEIGHT: 167.2 LBS | HEIGHT: 65 IN | RESPIRATION RATE: 21 BRPM | HEART RATE: 86 BPM | BODY MASS INDEX: 27.86 KG/M2 | SYSTOLIC BLOOD PRESSURE: 104 MMHG | OXYGEN SATURATION: 98 % | TEMPERATURE: 98.2 F

## 2024-07-16 DIAGNOSIS — K21.9 GASTROESOPHAGEAL REFLUX DISEASE WITHOUT ESOPHAGITIS: Primary | ICD-10-CM

## 2024-07-16 DIAGNOSIS — F20.3 UNDIFFERENTIATED SCHIZOPHRENIA (HCC): ICD-10-CM

## 2024-07-16 DIAGNOSIS — R79.89 ELEVATED TROPONIN: ICD-10-CM

## 2024-07-16 DIAGNOSIS — F33.9 RECURRENT MAJOR DEPRESSIVE DISORDER, REMISSION STATUS UNSPECIFIED (HCC): ICD-10-CM

## 2024-07-16 PROCEDURE — G8419 CALC BMI OUT NRM PARAM NOF/U: HCPCS

## 2024-07-16 PROCEDURE — 1036F TOBACCO NON-USER: CPT

## 2024-07-16 PROCEDURE — G8427 DOCREV CUR MEDS BY ELIG CLIN: HCPCS

## 2024-07-16 PROCEDURE — 99204 OFFICE O/P NEW MOD 45 MIN: CPT

## 2024-07-16 SDOH — ECONOMIC STABILITY: HOUSING INSECURITY
IN THE LAST 12 MONTHS, WAS THERE A TIME WHEN YOU DID NOT HAVE A STEADY PLACE TO SLEEP OR SLEPT IN A SHELTER (INCLUDING NOW)?: NO

## 2024-07-16 SDOH — ECONOMIC STABILITY: FOOD INSECURITY: WITHIN THE PAST 12 MONTHS, THE FOOD YOU BOUGHT JUST DIDN'T LAST AND YOU DIDN'T HAVE MONEY TO GET MORE.: NEVER TRUE

## 2024-07-16 SDOH — ECONOMIC STABILITY: INCOME INSECURITY: HOW HARD IS IT FOR YOU TO PAY FOR THE VERY BASICS LIKE FOOD, HOUSING, MEDICAL CARE, AND HEATING?: NOT VERY HARD

## 2024-07-16 SDOH — ECONOMIC STABILITY: FOOD INSECURITY: WITHIN THE PAST 12 MONTHS, YOU WORRIED THAT YOUR FOOD WOULD RUN OUT BEFORE YOU GOT MONEY TO BUY MORE.: NEVER TRUE
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
## 2024-08-09 PROBLEM — R79.89 ELEVATED TROPONIN: Status: RESOLVED | Noted: 2024-07-10 | Resolved: 2024-08-09

## 2024-10-06 ENCOUNTER — HOSPITAL ENCOUNTER (EMERGENCY)
Age: 24
Discharge: HOME | End: 2024-10-06
Attending: EMERGENCY MEDICINE
Payer: MEDICARE

## 2024-10-06 VITALS
SYSTOLIC BLOOD PRESSURE: 116 MMHG | RESPIRATION RATE: 20 BRPM | DIASTOLIC BLOOD PRESSURE: 97 MMHG | OXYGEN SATURATION: 99 % | HEART RATE: 104 BPM

## 2024-10-06 DIAGNOSIS — M54.50 ACUTE BILATERAL LOW BACK PAIN WITHOUT SCIATICA: Primary | ICD-10-CM

## 2024-10-06 PROCEDURE — 99282 EMERGENCY DEPT VISIT SF MDM: CPT

## 2024-10-06 RX ORDER — IBUPROFEN 200 MG
400 TABLET ORAL ONCE
Status: DISCONTINUED | OUTPATIENT
Start: 2024-10-06 | End: 2024-10-06 | Stop reason: HOSPADM

## 2024-10-06 ASSESSMENT — SLEEP AND FATIGUE QUESTIONNAIRES: DO YOU HAVE DIFFICULTY SLEEPING: COMMENT

## 2024-10-06 ASSESSMENT — PATIENT HEALTH QUESTIONNAIRE - PHQ9
SUM OF ALL RESPONSES TO PHQ QUESTIONS 1-9: 0
2. FEELING DOWN, DEPRESSED OR HOPELESS: NOT AT ALL
1. LITTLE INTEREST OR PLEASURE IN DOING THINGS: NOT AT ALL
SUM OF ALL RESPONSES TO PHQ9 QUESTIONS 1 & 2: 0

## 2024-10-06 ASSESSMENT — LIFESTYLE VARIABLES
HOW MANY STANDARD DRINKS CONTAINING ALCOHOL DO YOU HAVE ON A TYPICAL DAY: 5 OR 6
HOW OFTEN DO YOU HAVE A DRINK CONTAINING ALCOHOL: 2-3 TIMES A WEEK

## 2024-10-06 NOTE — ED PROVIDER NOTES
indications and risks of medications were discussed with the patient /family present. Strict verbal and written return precautions, instructions and appropriate follow-up provided to  the patient .     ED Medications administered this visit:  (None if blank)  Medications   ibuprofen (ADVIL;MOTRIN) tablet 400 mg (has no administration in time range)               DIAGNOSTIC RESULTS     EKG: All EKG's are interpreted by the Emergency Department Physician who either signs or Co-signs this chart in the absence of a cardiologist.      RADIOLOGY: non-plain film images(s) such as CT, Ultrasound and MRI are read by the radiologist.  Plain radiographic images are visualized and preliminarily interpreted by the emergency physician unless otherwise stated below.      LABS:   Labs Reviewed - No data to display    EMERGENCY DEPARTMENT COURSE:   Vitals:    Vitals:    10/06/24 1137   BP: (!) 116/97   Pulse: (!) 104   Resp: 20   SpO2: 99%         CRITICAL CARE:       CONSULTS:  None    PROCEDURES:  none    FINAL IMPRESSION      1. Acute bilateral low back pain without sciatica          DISPOSITION/PLAN   Decision To Discharge    PATIENT REFERRED TO:  Deidra Ambrosio MD  [address] [place] [address]  [phone]      Follow-up; USE IBUPROFEN FOR BACK PAIN      DISCHARGE MEDICATIONS:  New Prescriptions    No medications on file       (Please note that portions of this note were completed with a voice recognition program.  Efforts were made to edit the dictations but occasionally words are mis-transcribed.)    DO Yahir Wharton Seth, DO  10/06/24 1242
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
## 2024-10-06 NOTE — ED TRIAGE NOTES
Patient pleasantly presents with concerns of back pain that started after he ate a lean cuisine meal. States it gave him chronic pain. Patient is very cooperative. Denies suicidal or homicidal ideations. States he would like to speak to a counselor with further details. States he is on disability and is unable to afford paying out of pocket costs to see a counselor frequently.

## 2024-10-07 ENCOUNTER — TELEPHONE (OUTPATIENT)
Dept: FAMILY MEDICINE CLINIC | Age: 24
End: 2024-10-07

## 2024-10-16 ENCOUNTER — HOSPITAL ENCOUNTER (OUTPATIENT)
Dept: PSYCHIATRY | Age: 24
Discharge: HOME | End: 2024-10-16

## 2024-10-16 ASSESSMENT — LIFESTYLE VARIABLES
HOW MANY STANDARD DRINKS CONTAINING ALCOHOL DO YOU HAVE ON A TYPICAL DAY: 3 OR 4
HOW OFTEN DO YOU HAVE A DRINK CONTAINING ALCOHOL: 2-4 TIMES A MONTH

## 2024-10-16 NOTE — PROGRESS NOTES
Biopsychosocial Assessment Note    Name: Corey Webster  Date: 10/16/2024  Start Time: 11:33 am  End Time:12:13 pm    Social work met with patient to complete the biopsychosocial assessment and CSSR-S.     Presenting Problem: Patient reports a history of suicidal ideations.  Patient reports that he has suicidal ideations every other day.  Patient reports what triggers his suicidal ideas is  when things go wrong and it impact his quality of life .    Psychosocial High Risk Factors (check all that apply)  Unable to obtain meds   Chronic illness/pain    Substance abuse   Lack of Family Support   Financial stress   Isolation        xxx  Inadequate Community Resources  Suicide attempt(s)      xxx  Not taking medications   Victim of crime   Developmental Delay  Unable to manage personal needs    Age 65 or older   Homeless  No transportation   Readmission within 30 days  Unemployment  Traumatic Event    Family/Supports identified: Patient reports that he has good support system.  Patient reports that he has 7 people that he can look for advice and support    Patient Strengths: Patient reports prayer .  DBT coping skills.    Patient Barriers: Patient reports that one of his barriers is his negative self talk.     CMHC/MH history: Patient reports that he has had mental health treatment at [facility].  Patient reports that he will be seeing Dr. Guzman at [facility].  Patient reports that he is waiting to get approval to get into [facility] for counseling.  Patient reports that he was diagnosed with schizophrenia and also bipolar .Patient reports that he is currently on mental health medications.    Patient Report and Notes: n/a      Speech:  Normal rate and tone    Thought Process/Content: Logical    Level of consciousness:  Alert    Response to Learning: Able to verbalize current knowledge/experience    Endings: None Reported    Gender: Male    Sexual Orientation : heterosexual     Suicidal Ideation:  Negative for

## 2024-10-29 ENCOUNTER — TELEMEDICINE (OUTPATIENT)
Dept: PSYCHIATRY | Age: 24
End: 2024-10-29
Payer: COMMERCIAL

## 2024-10-29 DIAGNOSIS — F33.2 SEVERE RECURRENT MAJOR DEPRESSION WITHOUT PSYCHOTIC FEATURES (HCC): Primary | ICD-10-CM

## 2024-10-29 PROCEDURE — 90792 PSYCH DIAG EVAL W/MED SRVCS: CPT | Performed by: PSYCHIATRY & NEUROLOGY

## 2024-10-29 NOTE — PROGRESS NOTES
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    Patient unable to benefit from a less intensive outpatient program due to requiring more structured environment, and severity of symptoms.  Provider has reasonable expectation that patient will make a timely and significant practical improvement in the presenting acute symptoms as a result of the BHIOP because patient displays intent to engage in program and is intellectually capable of processing and subsequently adopting the skills and lessons taught in IOP.  Patient would also benefit from more frequent medication management than would get on total outpatient level of care.   History obtained from:  patient, electronic medical record, [facility] treatment team      Behavioral Services  Medicare Certification for Outpatient Psychiatric Services    I certify that this patient's outpatient psychiatric services are medically necessary for:     X (1) Treatment which could reasonably be expected to improve this patient's condition,      X (2) Or for diagnostic study;     AND    X (2) The psychiatric services are provided while the individual is under the care of a physician and are included in the individualized plan of care.    In addition, I certify that this patient would otherwise

## 2024-11-26 ENCOUNTER — OFFICE VISIT (OUTPATIENT)
Dept: FAMILY MEDICINE CLINIC | Age: 24
End: 2024-11-26

## 2024-11-26 VITALS
WEIGHT: 179.4 LBS | HEART RATE: 96 BPM | TEMPERATURE: 97.8 F | DIASTOLIC BLOOD PRESSURE: 82 MMHG | BODY MASS INDEX: 29.89 KG/M2 | HEIGHT: 65 IN | RESPIRATION RATE: 16 BRPM | SYSTOLIC BLOOD PRESSURE: 120 MMHG

## 2024-11-26 DIAGNOSIS — M25.50 POLYARTHRALGIA: ICD-10-CM

## 2024-11-26 DIAGNOSIS — Z13.220 LIPID SCREENING: ICD-10-CM

## 2024-11-26 DIAGNOSIS — Z11.59 NEED FOR HEPATITIS C SCREENING TEST: ICD-10-CM

## 2024-11-26 DIAGNOSIS — F25.0 SCHIZOAFFECTIVE DISORDER, BIPOLAR TYPE (HCC): Primary | ICD-10-CM

## 2024-11-26 DIAGNOSIS — F31.9 BIPOLAR DISORDER WITH PSYCHOTIC FEATURES (HCC): ICD-10-CM

## 2024-11-26 DIAGNOSIS — Z11.4 ENCOUNTER FOR SCREENING FOR HIV: ICD-10-CM

## 2024-11-26 RX ORDER — CLOMIPRAMINE HYDROCHLORIDE 75 MG/1
75 CAPSULE ORAL NIGHTLY
COMMUNITY
Start: 2024-11-11

## 2024-11-26 RX ORDER — CARIPRAZINE 1.5 MG/1
1.5 CAPSULE, GELATIN COATED ORAL DAILY
COMMUNITY
Start: 2024-11-04

## 2024-11-26 ASSESSMENT — ENCOUNTER SYMPTOMS
COUGH: 0
CONSTIPATION: 0
VOMITING: 0
DIARRHEA: 0
ABDOMINAL PAIN: 0
SHORTNESS OF BREATH: 0
NAUSEA: 0

## 2024-11-26 NOTE — PROGRESS NOTES
Corey Webster is a 24 y.o. male who presents today for:  Chief Complaint   Patient presents with    New Patient     Get established, pt was recently see in July by Deidra Ambrosio MD at [facility]. Pt would like to discuss TMS, deep tissue massage therapy, and mental health counseling.         HPI:     History of Present Illness  The patient is a 24-year-old male who presents for evaluation of multiple medical concerns.    Per patient report, he has been managing Lyme disease, which was diagnosed when he was 11. Despite testing negative for Lyme disease, he continues to experience pain. 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 His pain varies in location and intensity, and is unpredictable. He experiences pain during activities such as driving, walking, and weightlifting. He finds it uncomfortable to lie down, but standing up alleviates the pain. His pain is directly proportional to his physical activity level. He does not experience pain during sleep. He has sought help from the [facility], but they were unable to identify the cause of his pain. He has also consulted with Dr. Oswald in [place] for his tension myositis syndrome. He has been advised symptoms may be related to underlying emotional and mental health issues as opposed to physical issues.  He was advised to to avoid discussing his pain with family and friends, and to stop retesting if his labs are normal. He has also been advised to visualize himself experiencing less pain during gym workouts. He has noticed that when he focuses on one symptom, another symptom arises. He feels that his body is constantly in a state of distress and is frustrated that he has never been able to find answers.    He has been diagnosed with bipolar disorder and schizophrenia, and has been experiencing PTSD nightmares. He is currently on prazosin for these conditions. He has been

## 2024-12-06 ENCOUNTER — HOSPITAL ENCOUNTER (EMERGENCY)
Age: 24
Discharge: HOME | End: 2024-12-06
Payer: MEDICARE

## 2024-12-06 VITALS
SYSTOLIC BLOOD PRESSURE: 119 MMHG | BODY MASS INDEX: 29.99 KG/M2 | HEIGHT: 65 IN | DIASTOLIC BLOOD PRESSURE: 90 MMHG | OXYGEN SATURATION: 98 % | HEART RATE: 98 BPM | TEMPERATURE: 97.8 F | WEIGHT: 180 LBS | RESPIRATION RATE: 18 BRPM

## 2024-12-06 DIAGNOSIS — F41.1 ANXIETY STATE: ICD-10-CM

## 2024-12-06 DIAGNOSIS — F32.A DEPRESSION, UNSPECIFIED DEPRESSION TYPE: Primary | ICD-10-CM

## 2024-12-06 DIAGNOSIS — F40.9 PHOBIA, UNSPECIFIED TYPE: ICD-10-CM

## 2024-12-06 LAB
AMPHETAMINES UR QL SCN: NEGATIVE
ANION GAP SERPL CALC-SCNC: 14 MEQ/L (ref 8–16)
APAP SERPL-MCNC: < 5 UG/ML (ref 0–20)
BARBITURATES UR QL SCN: NEGATIVE
BASOPHILS ABSOLUTE: 0 THOU/MM3 (ref 0–0.1)
BASOPHILS NFR BLD AUTO: 0.4 %
BENZODIAZ UR QL SCN: NEGATIVE
BUN SERPL-MCNC: 11 MG/DL (ref 7–22)
BZE UR QL SCN: NEGATIVE
CALCIUM SERPL-MCNC: 9.2 MG/DL (ref 8.5–10.5)
CANNABINOIDS UR QL SCN: NEGATIVE
CHLORIDE SERPL-SCNC: 104 MEQ/L (ref 98–111)
CO2 SERPL-SCNC: 24 MEQ/L (ref 23–33)
CREAT SERPL-MCNC: 0.8 MG/DL (ref 0.4–1.2)
DEPRECATED RDW RBC AUTO: 38.7 FL (ref 35–45)
EOSINOPHIL NFR BLD AUTO: 0.9 %
EOSINOPHILS ABSOLUTE: 0.1 THOU/MM3 (ref 0–0.4)
ERYTHROCYTE [DISTWIDTH] IN BLOOD BY AUTOMATED COUNT: 12.5 % (ref 11.5–14.5)
ETHANOL SERPL-MCNC: < 0.01 % (ref 0–0.08)
FENTANYL: NEGATIVE
GFR SERPL CREATININE-BSD FRML MDRD: > 90 ML/MIN/1.73M2
GLUCOSE SERPL-MCNC: 83 MG/DL (ref 70–108)
HCT VFR BLD AUTO: 47 % (ref 42–52)
HGB BLD-MCNC: 16.2 GM/DL (ref 14–18)
IMM GRANULOCYTES # BLD AUTO: 0.02 THOU/MM3 (ref 0–0.07)
IMM GRANULOCYTES NFR BLD AUTO: 0.3 %
LYMPHOCYTES ABSOLUTE: 1.4 THOU/MM3 (ref 1–4.8)
LYMPHOCYTES NFR BLD AUTO: 19.5 %
MCH RBC QN AUTO: 29.5 PG (ref 26–33)
MCHC RBC AUTO-ENTMCNC: 34.5 GM/DL (ref 32.2–35.5)
MCV RBC AUTO: 85.5 FL (ref 80–94)
MONOCYTES ABSOLUTE: 0.7 THOU/MM3 (ref 0.4–1.3)
MONOCYTES NFR BLD AUTO: 9.8 %
NEUTROPHILS ABSOLUTE: 4.8 THOU/MM3 (ref 1.8–7.7)
NEUTROPHILS NFR BLD AUTO: 69.1 %
NRBC BLD AUTO-RTO: 0 /100 WBC
OPIATES UR QL SCN: NEGATIVE
OSMOLALITY SERPL CALC.SUM OF ELEC: 281.7 MOSMOL/KG (ref 275–300)
OXYCODONE: NEGATIVE
PCP UR QL SCN: NEGATIVE
PLATELET # BLD AUTO: 281 THOU/MM3 (ref 130–400)
PMV BLD AUTO: 9 FL (ref 9.4–12.4)
POTASSIUM SERPL-SCNC: 4.3 MEQ/L (ref 3.5–5.2)
RBC # BLD AUTO: 5.5 MILL/MM3 (ref 4.7–6.1)
SALICYLATES SERPL-MCNC: < 0.3 MG/DL (ref 2–10)
SODIUM SERPL-SCNC: 142 MEQ/L (ref 135–145)
WBC # BLD AUTO: 7 THOU/MM3 (ref 4.8–10.8)

## 2024-12-06 PROCEDURE — 82077 ASSAY SPEC XCP UR&BREATH IA: CPT

## 2024-12-06 PROCEDURE — 80179 DRUG ASSAY SALICYLATE: CPT

## 2024-12-06 PROCEDURE — 85025 COMPLETE CBC W/AUTO DIFF WBC: CPT

## 2024-12-06 PROCEDURE — 99283 EMERGENCY DEPT VISIT LOW MDM: CPT

## 2024-12-06 PROCEDURE — 80143 DRUG ASSAY ACETAMINOPHEN: CPT

## 2024-12-06 PROCEDURE — 80307 DRUG TEST PRSMV CHEM ANLYZR: CPT

## 2024-12-06 PROCEDURE — 36415 COLL VENOUS BLD VENIPUNCTURE: CPT

## 2024-12-06 PROCEDURE — 80048 BASIC METABOLIC PNL TOTAL CA: CPT

## 2024-12-06 ASSESSMENT — SLEEP AND FATIGUE QUESTIONNAIRES
DO YOU USE A SLEEP AID: YES
SLEEP PATTERN: DIFFICULTY FALLING ASLEEP;DISTURBED/INTERRUPTED SLEEP
DO YOU HAVE DIFFICULTY SLEEPING: YES

## 2024-12-06 ASSESSMENT — PATIENT HEALTH QUESTIONNAIRE - PHQ9
6. FEELING BAD ABOUT YOURSELF - OR THAT YOU ARE A FAILURE OR HAVE LET YOURSELF OR YOUR FAMILY DOWN: NEARLY EVERY DAY
SUM OF ALL RESPONSES TO PHQ QUESTIONS 1-9: 10
10. IF YOU CHECKED OFF ANY PROBLEMS, HOW DIFFICULT HAVE THESE PROBLEMS MADE IT FOR YOU TO DO YOUR WORK, TAKE CARE OF THINGS AT HOME, OR GET ALONG WITH OTHER PEOPLE: SOMEWHAT DIFFICULT
9. THOUGHTS THAT YOU WOULD BE BETTER OFF DEAD, OR OF HURTING YOURSELF: NOT AT ALL
4. FEELING TIRED OR HAVING LITTLE ENERGY: NOT AT ALL
1. LITTLE INTEREST OR PLEASURE IN DOING THINGS: NEARLY EVERY DAY
2. FEELING DOWN, DEPRESSED OR HOPELESS: MORE THAN HALF THE DAYS
7. TROUBLE CONCENTRATING ON THINGS, SUCH AS READING THE NEWSPAPER OR WATCHING TELEVISION: NOT AT ALL
SUM OF ALL RESPONSES TO PHQ QUESTIONS 1-9: 10
SUM OF ALL RESPONSES TO PHQ QUESTIONS 1-9: 10
8. MOVING OR SPEAKING SO SLOWLY THAT OTHER PEOPLE COULD HAVE NOTICED. OR THE OPPOSITE, BEING SO FIGETY OR RESTLESS THAT YOU HAVE BEEN MOVING AROUND A LOT MORE THAN USUAL: NOT AT ALL
SUM OF ALL RESPONSES TO PHQ9 QUESTIONS 1 & 2: 5
5. POOR APPETITE OR OVEREATING: NOT AT ALL
SUM OF ALL RESPONSES TO PHQ QUESTIONS 1-9: 10
3. TROUBLE FALLING OR STAYING ASLEEP: MORE THAN HALF THE DAYS

## 2024-12-06 ASSESSMENT — LIFESTYLE VARIABLES
HOW OFTEN DO YOU HAVE A DRINK CONTAINING ALCOHOL: NEVER
HOW MANY STANDARD DRINKS CONTAINING ALCOHOL DO YOU HAVE ON A TYPICAL DAY: PATIENT DOES NOT DRINK

## 2024-12-07 NOTE — DISCHARGE INSTRUCTIONS
Follow-up with [facility] to soon as possible.  Changes in psychiatric medications can lead to symptoms of OCD.  For further resolution and continued care of PTSD and unresolved feelings and past complex follow-up with [facility] counselor.    Fever feel suicidal/homicidal, experiencing visual/auditory hallucinations, or otherwise have any other concerns please return to the ER.

## 2024-12-07 NOTE — ED PROVIDER NOTES
Abdomen is flat.      Palpations: Abdomen is soft.   Musculoskeletal:         General: Normal range of motion.      Cervical back: Normal range of motion.   Skin:     General: Skin is warm and dry.      Capillary Refill: Capillary refill takes less than 2 seconds.      Coloration: Skin is not jaundiced.      Findings: No rash.   Neurological:      General: No focal deficit present.      Mental Status: He is alert and oriented to person, place, and time.      Cranial Nerves: No cranial nerve deficit.      Sensory: No sensory deficit.      Motor: No weakness.   Psychiatric:         Mood and Affect: Mood normal.         Behavior: Behavior normal.      Comments: Patient appears to have a ready mirth response.  Patient denying suicidality or homicidality.         FORMAL DIAGNOSTIC RESULTS     RADIOLOGY: Interpretation per the Radiologist below, if available at the time of this note (none if blank):    No orders to display       LABS: (none if blank)  Labs Reviewed   CBC WITH AUTO DIFFERENTIAL   BASIC METABOLIC PANEL   ETHANOL   SALICYLATE LEVEL   ACETAMINOPHEN LEVEL   URINE DRUG SCREEN       (Any cultures that may have been sent were not resulted at the time of this patient visit)    MEDICAL DECISION MAKING / ED COURSE:     1) Number and Complexity of Problems            Problem List This Visit:         Chief Complaint   Patient presents with    Mental Health Problem             Differential Diagnosis includes (but not limited to):  Psychosis, anxiety, depression, OCD,        Diagnoses Considered but I have low suspicion of:   Less likely current suicidality or homicidality after speaking with the patient.             Pertinent Comorbid Conditions:    Anxiety, depression, schizoaffective disorder, bipolar disorder previously diagnosed    2)  Data Reviewed (none if left blank)          My Independent interpretations:     EKG:      None    Imaging: None    Labs:      Psychiatric clearance labs pending                            OUTPATIENT FOLLOW UP THE PATIENT:  No follow-up provider specified.    JUVE Sparks Robert A, PA  12/06/24 2040

## 2024-12-07 NOTE — ED NOTES
Pt presents to ED via lobby for mental health evaluation. Pt states that he does have a hx of anxiety and depression, and has noticed they have been getting worse over the last few months. Pt has been taking medications as prescribed. Pt states that he has noticed this effecting his family life and work, and wants to be proactive with seeking help. Denies suicidal or homicidal thoughts at this time. Pt placed in [facility] safe room at this time.

## 2024-12-07 NOTE — DISCHARGE INSTR - COC
Continuity of Care Form    Patient Name: Corey Webster   :  2000  MRN:  [id number]    Admit date:  2024  Discharge date:  ***    Code Status Order: Prior   Advance Directives:   Advance Care Flowsheet Documentation             Admitting Physician:  No admitting provider for patient encounter.  PCP: Connie Ortez MD    Discharging Nurse: ***  Discharging Hospital Unit/Room#: 27/027A  Discharging Unit Phone Number: ***    Emergency Contact:   Extended Emergency Contact Information  Primary Emergency Contact: Elias Webster   [facility]  Home Phone: [phone]  Mobile Phone: [phone]  Relation: Parent  Secondary Emergency Contact: Bonnie Webster   [facility]  Home Phone: [phone]  Relation: Parent    Past Surgical History:  Past Surgical History:   Procedure Laterality Date    APPENDECTOMY      CYST REMOVAL      knee    SHOULDER SURGERY Right        Immunization History:   Immunization History   Administered Date(s) Administered    DTaP vaccine 2000, 1999, 1999, 1999, 2004    HPV, GARDASIL 9, (age 9y-45y), IM, 0.5mL 2016, 2016, 11/10/2016    Hep A, HAVRIX, VAQTA, (age 12m-18y), IM, 0.5mL 2016, 11/10/2016    Hep B/Hib (Comvax) 1998, 1999    Hepatitis B 2000    Hib (HbOC) 1999, 1999    Influenza A (V7T4-39) Vaccine PF IM 2009    Influenza Virus Vaccine 10/20/2005, 10/26/2006, 10/18/2007, 10/09/2009, 09/15/2014, 2015, 10/18/2016    Influenza Whole 10/23/2008, 10/19/2010    Influenza, FLUCELVAX, (age 6 mo+), MDCK, Quadv PF, 0.5mL 10/24/2013    Measles/Rubella 1999, 2004    Meningococcal ACWY, MENACTRA (MenACWY-D), (age 9m-55y), IM, 0.5mL 2016    Meningococcal ACWY, MENVEO (MenACWY-CRM), (age 2m-55y), IM, 0.5mL 2013    Meningococcal B, BEXSERO, (age 10y-25y), IM, 0.5mL 2016, 2016    Polio OPV 1999    Poliovirus, IPOL, (age 6w+), SC/IM, 0.5mL

## 2024-12-07 NOTE — PROGRESS NOTES
[facility] CRISIS ASSESSMENT    SITUATION  Chief Complaint per ED Provider or Assigned Nurse report:   Mental Health Evaluation     Chief Complaint per Patient report  Germaphobia      Chief Complaint per Collateral contact report (Identify who and if they are present with the patient or if contacted by phone)  Pt arrived alone, voluntarily, level B    If collateral was not obtained why (if obtained then NA):  Pt alone     Provisional Diagnosis (ICD or DSM approved diagnosis only) : OCD      BACKGROUND  Risk, Psychosocial and Contextual Factors: (EXAMPLE - homeless, lack of social support, lack of family, unemployed, debt, legal, etc.): Germaphobia affecting work    Protective Factors:  Linked to outpatient mental health treatment    Current MH Treatment: [facility]       Past MH Treatment or Hospitalization (Previous 6 months):   [facility] of [facility] [place] [facility]  Last admission at [facility] was on 2/6/24.       Present Suicidal Behavior (Include specific information below):      Verbal:  Denies     Attempt:  Denies     Access to Weapons:   Denies     Access to the Means of self harm or harm to others identified:   Denies      C-SSRS Current Suicide Risk: Low, Moderate or High:    Low       Past Suicidal Behavior (Include specific information below):       Verbal:  X    Attempts:   Denies     Self-Injurious/Self-Mutilation: (Specify what, how often, last time, method, etc.)   Denies     Traumatic Event Within Past 2 Weeks: (Specify)  Denies     Current Abuse:  (type, perpetrator, systems involved, injuries, etc.)  Denies       Legal Involvement: (Specify)   Denies     Violence: (Specify)   Denies     Housing:   Lives alone.  Parents are supportive     CPAP/Oxygen/Ambulation Difficulties Provide pertinent results HERE.  (\"See H&P\" or \"Record\" is not acceptable response): None     Critical Lab Results (Provide pertinent results HERE.  \"See H&P\" or \"Record\" is not acceptable response.:         Assessment  Clinical

## 2025-02-15 ENCOUNTER — HOSPITAL ENCOUNTER (INPATIENT)
Age: 25
LOS: 5 days | Discharge: HOME | DRG: 885 | End: 2025-02-20
Attending: EMERGENCY MEDICINE | Admitting: PSYCHIATRY & NEUROLOGY
Payer: MEDICARE

## 2025-02-15 DIAGNOSIS — R45.851 SUICIDAL IDEATION: Primary | ICD-10-CM

## 2025-02-15 PROBLEM — F33.2 MDD (MAJOR DEPRESSIVE DISORDER), RECURRENT SEVERE, WITHOUT PSYCHOSIS (HCC): Status: ACTIVE | Noted: 2025-02-15

## 2025-02-15 LAB
AMPHETAMINES UR QL SCN: NEGATIVE
ANION GAP SERPL CALC-SCNC: 16 MEQ/L (ref 8–16)
APAP SERPL-MCNC: < 5 UG/ML (ref 0–20)
BARBITURATES UR QL SCN: NEGATIVE
BASOPHILS ABSOLUTE: 0 THOU/MM3 (ref 0–0.1)
BASOPHILS NFR BLD AUTO: 0.4 %
BENZODIAZ UR QL SCN: NEGATIVE
BUN SERPL-MCNC: 11 MG/DL (ref 7–22)
BZE UR QL SCN: NEGATIVE
CALCIUM SERPL-MCNC: 8.9 MG/DL (ref 8.5–10.5)
CANNABINOIDS UR QL SCN: NEGATIVE
CHLORIDE SERPL-SCNC: 96 MEQ/L (ref 98–111)
CO2 SERPL-SCNC: 25 MEQ/L (ref 23–33)
CREAT SERPL-MCNC: 0.7 MG/DL (ref 0.4–1.2)
DEPRECATED RDW RBC AUTO: 38.4 FL (ref 35–45)
EOSINOPHIL NFR BLD AUTO: 1.5 %
EOSINOPHILS ABSOLUTE: 0.1 THOU/MM3 (ref 0–0.4)
ERYTHROCYTE [DISTWIDTH] IN BLOOD BY AUTOMATED COUNT: 12.5 % (ref 11.5–14.5)
ETHANOL SERPL-MCNC: < 0.01 % (ref 0–0.08)
FENTANYL: NEGATIVE
GFR SERPL CREATININE-BSD FRML MDRD: > 90 ML/MIN/1.73M2
GLUCOSE SERPL-MCNC: 86 MG/DL (ref 70–108)
HCT VFR BLD AUTO: 50.3 % (ref 42–52)
HGB BLD-MCNC: 17.2 GM/DL (ref 14–18)
IMM GRANULOCYTES # BLD AUTO: 0.02 THOU/MM3 (ref 0–0.07)
IMM GRANULOCYTES NFR BLD AUTO: 0.2 %
LYMPHOCYTES ABSOLUTE: 1.7 THOU/MM3 (ref 1–4.8)
LYMPHOCYTES NFR BLD AUTO: 21.6 %
MCH RBC QN AUTO: 29.2 PG (ref 26–33)
MCHC RBC AUTO-ENTMCNC: 34.2 GM/DL (ref 32.2–35.5)
MCV RBC AUTO: 85.4 FL (ref 80–94)
MONOCYTES ABSOLUTE: 0.6 THOU/MM3 (ref 0.4–1.3)
MONOCYTES NFR BLD AUTO: 6.9 %
NEUTROPHILS ABSOLUTE: 5.6 THOU/MM3 (ref 1.8–7.7)
NEUTROPHILS NFR BLD AUTO: 69.4 %
NRBC BLD AUTO-RTO: 0 /100 WBC
OPIATES UR QL SCN: NEGATIVE
OSMOLALITY SERPL CALC.SUM OF ELEC: 272.5 MOSMOL/KG (ref 275–300)
OXYCODONE: NEGATIVE
PCP UR QL SCN: NEGATIVE
PLATELET # BLD AUTO: 285 THOU/MM3 (ref 130–400)
PMV BLD AUTO: 8.8 FL (ref 9.4–12.4)
POTASSIUM SERPL-SCNC: 4 MEQ/L (ref 3.5–5.2)
RBC # BLD AUTO: 5.89 MILL/MM3 (ref 4.7–6.1)
SALICYLATES SERPL-MCNC: < 0.3 MG/DL (ref 2–10)
SODIUM SERPL-SCNC: 137 MEQ/L (ref 135–145)
WBC # BLD AUTO: 8.1 THOU/MM3 (ref 4.8–10.8)

## 2025-02-15 PROCEDURE — 80307 DRUG TEST PRSMV CHEM ANLYZR: CPT

## 2025-02-15 PROCEDURE — 1240000000 HC EMOTIONAL WELLNESS R&B

## 2025-02-15 PROCEDURE — 80143 DRUG ASSAY ACETAMINOPHEN: CPT

## 2025-02-15 PROCEDURE — 93005 ELECTROCARDIOGRAM TRACING: CPT

## 2025-02-15 PROCEDURE — 6370000000 HC RX 637 (ALT 250 FOR IP): Performed by: PSYCHIATRY & NEUROLOGY

## 2025-02-15 PROCEDURE — 85025 COMPLETE CBC W/AUTO DIFF WBC: CPT

## 2025-02-15 PROCEDURE — 82077 ASSAY SPEC XCP UR&BREATH IA: CPT

## 2025-02-15 PROCEDURE — GZHZZZZ GROUP PSYCHOTHERAPY: ICD-10-PCS | Performed by: PSYCHIATRY & NEUROLOGY

## 2025-02-15 PROCEDURE — 36415 COLL VENOUS BLD VENIPUNCTURE: CPT

## 2025-02-15 PROCEDURE — 80048 BASIC METABOLIC PNL TOTAL CA: CPT

## 2025-02-15 PROCEDURE — 80179 DRUG ASSAY SALICYLATE: CPT

## 2025-02-15 PROCEDURE — 99285 EMERGENCY DEPT VISIT HI MDM: CPT

## 2025-02-15 RX ORDER — TRAZODONE HYDROCHLORIDE 50 MG/1
50 TABLET ORAL NIGHTLY PRN
Status: DISCONTINUED | OUTPATIENT
Start: 2025-02-15 | End: 2025-02-20 | Stop reason: HOSPADM

## 2025-02-15 RX ORDER — PRAZOSIN HYDROCHLORIDE 1 MG/1
2 CAPSULE ORAL NIGHTLY
Status: DISCONTINUED | OUTPATIENT
Start: 2025-02-15 | End: 2025-02-19

## 2025-02-15 RX ORDER — ACETAMINOPHEN 325 MG/1
650 TABLET ORAL EVERY 4 HOURS PRN
Status: DISCONTINUED | OUTPATIENT
Start: 2025-02-15 | End: 2025-02-20 | Stop reason: HOSPADM

## 2025-02-15 RX ORDER — MAGNESIUM HYDROXIDE/ALUMINUM HYDROXICE/SIMETHICONE 120; 1200; 1200 MG/30ML; MG/30ML; MG/30ML
30 SUSPENSION ORAL EVERY 6 HOURS PRN
Status: DISCONTINUED | OUTPATIENT
Start: 2025-02-15 | End: 2025-02-20 | Stop reason: HOSPADM

## 2025-02-15 RX ORDER — IBUPROFEN 400 MG/1
400 TABLET, FILM COATED ORAL EVERY 6 HOURS PRN
Status: DISCONTINUED | OUTPATIENT
Start: 2025-02-15 | End: 2025-02-20 | Stop reason: HOSPADM

## 2025-02-15 RX ORDER — BENZTROPINE MESYLATE 1 MG/1
0.5 TABLET ORAL DAILY
Status: DISCONTINUED | OUTPATIENT
Start: 2025-02-15 | End: 2025-02-16

## 2025-02-15 RX ORDER — CLOMIPRAMINE HYDROCHLORIDE 50 MG/1
50 CAPSULE ORAL NIGHTLY
Status: DISCONTINUED | OUTPATIENT
Start: 2025-02-15 | End: 2025-02-20 | Stop reason: HOSPADM

## 2025-02-15 RX ORDER — HYDROXYZINE HYDROCHLORIDE 25 MG/1
50 TABLET, FILM COATED ORAL 3 TIMES DAILY PRN
Status: DISCONTINUED | OUTPATIENT
Start: 2025-02-15 | End: 2025-02-15

## 2025-02-15 RX ORDER — HYDROXYZINE PAMOATE 25 MG/1
50 CAPSULE ORAL 4 TIMES DAILY PRN
Status: DISCONTINUED | OUTPATIENT
Start: 2025-02-15 | End: 2025-02-18

## 2025-02-15 RX ADMIN — CARIPRAZINE 1.5 MG: 1.5 CAPSULE, GELATIN COATED ORAL at 22:42

## 2025-02-15 RX ADMIN — CLOMIPRAMINE HYDROCHLORIDE 50 MG: 50 CAPSULE ORAL at 22:56

## 2025-02-15 RX ADMIN — BENZTROPINE MESYLATE 0.5 MG: 1 TABLET ORAL at 22:43

## 2025-02-15 RX ADMIN — PRAZOSIN HYDROCHLORIDE 2 MG: 1 CAPSULE ORAL at 22:43

## 2025-02-15 ASSESSMENT — PATIENT HEALTH QUESTIONNAIRE - PHQ9
SUM OF ALL RESPONSES TO PHQ QUESTIONS 1-9: 5
4. FEELING TIRED OR HAVING LITTLE ENERGY: NEARLY EVERY DAY
1. LITTLE INTEREST OR PLEASURE IN DOING THINGS: NEARLY EVERY DAY
SUM OF ALL RESPONSES TO PHQ QUESTIONS 1-9: 20
2. FEELING DOWN, DEPRESSED OR HOPELESS: MORE THAN HALF THE DAYS
3. TROUBLE FALLING OR STAYING ASLEEP: NEARLY EVERY DAY
5. POOR APPETITE OR OVEREATING: NEARLY EVERY DAY
SUM OF ALL RESPONSES TO PHQ QUESTIONS 1-9: 20
2. FEELING DOWN, DEPRESSED OR HOPELESS: NEARLY EVERY DAY
7. TROUBLE CONCENTRATING ON THINGS, SUCH AS READING THE NEWSPAPER OR WATCHING TELEVISION: SEVERAL DAYS
8. MOVING OR SPEAKING SO SLOWLY THAT OTHER PEOPLE COULD HAVE NOTICED. OR THE OPPOSITE, BEING SO FIGETY OR RESTLESS THAT YOU HAVE BEEN MOVING AROUND A LOT MORE THAN USUAL: NOT AT ALL
9. THOUGHTS THAT YOU WOULD BE BETTER OFF DEAD, OR OF HURTING YOURSELF: SEVERAL DAYS
SUM OF ALL RESPONSES TO PHQ9 QUESTIONS 1 & 2: 6
1. LITTLE INTEREST OR PLEASURE IN DOING THINGS: NEARLY EVERY DAY
SUM OF ALL RESPONSES TO PHQ9 QUESTIONS 1 & 2: 5
SUM OF ALL RESPONSES TO PHQ QUESTIONS 1-9: 5
10. IF YOU CHECKED OFF ANY PROBLEMS, HOW DIFFICULT HAVE THESE PROBLEMS MADE IT FOR YOU TO DO YOUR WORK, TAKE CARE OF THINGS AT HOME, OR GET ALONG WITH OTHER PEOPLE: VERY DIFFICULT
SUM OF ALL RESPONSES TO PHQ QUESTIONS 1-9: 20
SUM OF ALL RESPONSES TO PHQ QUESTIONS 1-9: 19
SUM OF ALL RESPONSES TO PHQ QUESTIONS 1-9: 5
6. FEELING BAD ABOUT YOURSELF - OR THAT YOU ARE A FAILURE OR HAVE LET YOURSELF OR YOUR FAMILY DOWN: NEARLY EVERY DAY
SUM OF ALL RESPONSES TO PHQ QUESTIONS 1-9: 5

## 2025-02-15 ASSESSMENT — LIFESTYLE VARIABLES
HOW MANY STANDARD DRINKS CONTAINING ALCOHOL DO YOU HAVE ON A TYPICAL DAY: PATIENT UNABLE TO ANSWER
HOW OFTEN DO YOU HAVE A DRINK CONTAINING ALCOHOL: MONTHLY OR LESS
HOW OFTEN DO YOU HAVE A DRINK CONTAINING ALCOHOL: NEVER

## 2025-02-15 ASSESSMENT — SLEEP AND FATIGUE QUESTIONNAIRES
AVERAGE NUMBER OF SLEEP HOURS: 16
AVERAGE NUMBER OF SLEEP HOURS: 16
DO YOU HAVE DIFFICULTY SLEEPING: YES
DO YOU USE A SLEEP AID: YES
SLEEP PATTERN: INSOMNIA
DO YOU USE A SLEEP AID: YES
SLEEP PATTERN: DISTURBED/INTERRUPTED SLEEP
DO YOU HAVE DIFFICULTY SLEEPING: NO

## 2025-02-15 NOTE — ED PROVIDER NOTES
[facility] EMERGENCY DEPARTMENT  EMERGENCY DEPARTMENT ENCOUNTER          Pt Name: Corey Webster  MRN: [id number]  Birthdate 2000  Date of evaluation: 2/15/2025  Physician: Reynold Queen MD  Supervising Attending Physician: Matthias Corea DO       CHIEF COMPLAINT       Chief Complaint   Patient presents with    Suicidal         HISTORY OF PRESENT ILLNESS    HPI  Corey Webster is a 24 y.o. male who presents to the emergency department from home, as a walk in to the ED lobby for evaluation of suicidal ideation.  Patient reports for the past couple weeks he is been having worsening thoughts.  He stated that he has also been sleeping more often.  He stated that 2 weeks ago he did have thoughts of killing himself.  He stated \"I was going to jump off a building but I was talked down.\"  He stated however that the thoughts of discontinued.  He reports that he has been taking all his medications at home.  He states however that the thoughts of gotten worse.  He stated that \"I would buy a gun and blow my brains out.\"  Denies any drugs or alcohol.  Denies any chest pain shortness of breath.  Patient reports that he was here voluntarily asking for help.  The patient has no other acute complaints at this time.            PAST MEDICAL AND SURGICAL HISTORY     Past Medical History:   Diagnosis Date    Arthritis     Depression     GERD (gastroesophageal reflux disease)     Mono exposure      Past Surgical History:   Procedure Laterality Date    APPENDECTOMY      CYST REMOVAL      knee    SHOULDER SURGERY Right          MEDICATIONS   No current facility-administered medications for this encounter.    Current Outpatient Medications:     VRAYLAR 1.5 MG capsule, Take 1 capsule by mouth daily, Disp: , Rfl:     clomiPRAMINE (ANAFRANIL) 75 MG capsule, Take 1 capsule by mouth nightly, Disp: , Rfl:     prazosin (MINIPRESS) 2 MG capsule, Take 1 capsule by mouth nightly, Disp: , Rfl:     hydrOXYzine pamoate

## 2025-02-16 LAB
EKG ATRIAL RATE: 97 BPM
EKG P AXIS: 67 DEGREES
EKG P-R INTERVAL: 150 MS
EKG Q-T INTERVAL: 344 MS
EKG QRS DURATION: 88 MS
EKG QTC CALCULATION (BAZETT): 436 MS
EKG R AXIS: 71 DEGREES
EKG T AXIS: 34 DEGREES
EKG VENTRICULAR RATE: 97 BPM

## 2025-02-16 PROCEDURE — 90792 PSYCH DIAG EVAL W/MED SRVCS: CPT | Performed by: PSYCHIATRY & NEUROLOGY

## 2025-02-16 PROCEDURE — 1240000000 HC EMOTIONAL WELLNESS R&B

## 2025-02-16 PROCEDURE — 93010 ELECTROCARDIOGRAM REPORT: CPT | Performed by: INTERNAL MEDICINE

## 2025-02-16 PROCEDURE — 6370000000 HC RX 637 (ALT 250 FOR IP): Performed by: PSYCHIATRY & NEUROLOGY

## 2025-02-16 RX ORDER — BENZTROPINE MESYLATE 1 MG/1
0.5 TABLET ORAL 2 TIMES DAILY
Status: DISCONTINUED | OUTPATIENT
Start: 2025-02-16 | End: 2025-02-20 | Stop reason: HOSPADM

## 2025-02-16 RX ADMIN — CARIPRAZINE 1.5 MG: 1.5 CAPSULE, GELATIN COATED ORAL at 08:22

## 2025-02-16 RX ADMIN — CLOMIPRAMINE HYDROCHLORIDE 50 MG: 50 CAPSULE ORAL at 20:33

## 2025-02-16 RX ADMIN — BENZTROPINE MESYLATE 0.5 MG: 1 TABLET ORAL at 08:22

## 2025-02-16 RX ADMIN — BENZTROPINE MESYLATE 0.5 MG: 1 TABLET ORAL at 21:44

## 2025-02-16 RX ADMIN — PRAZOSIN HYDROCHLORIDE 2 MG: 1 CAPSULE ORAL at 20:33

## 2025-02-16 ASSESSMENT — PAIN SCALES - GENERAL: PAINLEVEL_OUTOF10: 0

## 2025-02-16 NOTE — H&P
Department of Psychiatry  Attending Physician Psychiatric Assessment     Reason for Admission to Psychiatric Unit:  Threat to self requiring 24 hour professional observation  Concerns about patient's safety in the community    CHIEF COMPLAINT:  Suicidal ideation with a plan to jump off a building    History obtained from:  patient, electronic medical record and family members    HISTORY OF PRESENT ILLNESS:    Corey Webster is a 24 y.o. male with significant past psychiatric history of bipolar disorder, OCD with psychosis who presented to the ED for worsening suicidal thoughts with a plan to kill self by jumping off a bridge.  Per ED note:  \"Corey Webster is a 24 y.o. male who presents to the emergency department from home, as a walk in to the ED lobby for evaluation of suicidal ideation.  Patient reports for the past couple weeks he is been having worsening thoughts.  He stated that he has also been sleeping more often.  He stated that 2 weeks ago he did have thoughts of killing himself.  He stated \"I was going to jump off a building but I was talked down.\"  He stated however that the thoughts of discontinued.  He reports that he has been taking all his medications at home.  He states however that the thoughts of gotten worse.  He stated that \"I would buy a gun and blow my brains out.\"  Denies any drugs or alcohol.  Denies any chest pain shortness of breath.  Patient reports that he was here voluntarily asking for help.  The patient has no other acute complaints at this time.\"  Here on the unit:  Patient reports that he has been dealing with increasing depression for last 1 month now.  Reports feeling sad and down for more days than not for last 1 month.  Reports feeling helpless hopeless and worthless.  Reports poor energy and concentration.  Reports poor appetite.  Reports that he has been sleeping excessively.  Identifies stressors as recently getting out of her relationship and failing a [employer] business.

## 2025-02-16 NOTE — BH NOTE
Behavioral Health   Admission Note   Admission Type: Voluntary    Reason for Admission: MDD    Patient Strengths/Barriers  Strengths (Must Choose Two): Support from family, Support from friends  Barriers: Support of organized community    Addictive Behavior  In the Past 3 Months, Have You Felt or Has Someone Told You That You Have a Problem With  : Sex/pornography, Gambling    Medical Problems:   Past Medical History:   Diagnosis Date    Arthritis     Depression     GERD (gastroesophageal reflux disease)     Mono exposure        Status EXAM:  Mental Status and Behavioral Exam  Normal: No  Level of Assistance: Independent/Self  Facial Expression: Exaggerated, Worried  Affect: Blunt  Level of Consciousness: Alert  Frequency of Checks: 4 times per hour, close  Mood:Normal: No  Mood: Depressed, Anxious, Empty, Worthless, low self-esteem  Motor Activity:Normal: Yes  Eye Contact: Good  Observed Behavior: Cooperative, Friendly  Sexual Misconduct History: Current - no  Preception: [place] to person, [place] to time, [place] to place, [place] to situation  Attention:Normal: Yes  Thought Processes: Circumstantial  Thought Content:Normal: No  Thought Content: Other (comment) (Negative thoughts;suicidal)  Depression Symptoms: Isolative, Loss of interest, Change in energy level, Appetite change  Anxiety Symptoms: Generalized  Roxie Symptoms: No problems reported or observed.  Hallucinations: None  Delusions: No  Memory:Normal: Yes  Insight and Judgment: No  Insight and Judgment: Poor judgment, Poor insight    Pt admitted with followings belongings:  Dental Appliances: None  Vision - Corrective Lenses: None  Hearing Aid: None  Jewelry: None  Body Piercings Removed: N/A  Clothing: Footwear, Jacket/Coat, Pants, Shirt, Shorts, Socks, Sweater, Undergarments  Other Valuables: Keys, Other (Comment) (paper notebook, red duffle/cooler)     Admission order obtained Yes  . Patient's home medications were locked in narc cabinet in bag

## 2025-02-16 NOTE — BH NOTE
INPATIENT RECREATIONAL THERAPY  ADULT BEHAVIORAL SERVICES  EVALUATION    REFERRING PHYSICIAN:  Dr. Perdomo   DIAGNOSIS:   Major Depressive Disorder   PRECAUTIONS:  Standard Precautions   HISTORY OF PRESENT ILLNESS/INJURY: Pt is admitted to the unit after walking into the ED with increased worsening thoughts and suicidal ideations. Pt reports sleeping more recently and having thoughts of jumping off a building. Pt reports taking medications at home and being compliant prior to admission. Pt has displayed increased paranoia and anxiety regarding his medications.   PMH:  Please see medical chart for prior medical history, allergies, and medication    HISTORY OF PSYCHIATRIC TREATMENT: Pt has a hx of previous inpatient psychiatric admissions and was last here on this unit in February 2024. Pt is linked OP with Dr. Guzman at [facility]. Pt reports that he was compliant with psychiatric admissions prior to this admission.   YOB: 2000  GENDER:  Male   MARITAL STATUS:  Single   EMPLOYMENT STATUS:  Disability   LIVING SITUATION/SUPPORT:  Lives alone  EDUCATIONAL LEVEL: HS Graduate and completed x1 year of community college.   MEDICATION/DRUG USE: Pt reports that he has used cannabis in the past and but denies any current illicit drug use. Pt UDS is negative and reports he does not smoke cigarettes. Pt reports that he has not used alcohol in the last 9 years.    LEISURE INTERESTS:   Activities with family, spiritual activities, watching TV/Movies, listening to music   ACTIVITY PREFERENCE: Individual or 1:1 Preferred   ACTIVITY TYPES:  Indoor, Outdoor, Active, Passive   COGNITION: A&OX4    COPING: Poor   ATTENTION: Fair   RELAXATION: Fair   SELF-ESTEEM: Poor   MOTIVATION:  Fair     SOCIAL SKILLS:  Fair   FRUSTRATION TOLERANCE:  No documented hx of violence   ATTENTION SEEKING: No attention seeking behaviors observed at this time   COOPERATION: Pt is pleasant and cooperative   AFFECT: Congruent   APPEARANCE: Pt

## 2025-02-17 PROBLEM — F31.4 BIPOLAR DISORDER WITH SEVERE DEPRESSION (HCC): Status: ACTIVE | Noted: 2025-02-15

## 2025-02-17 PROCEDURE — 6370000000 HC RX 637 (ALT 250 FOR IP): Performed by: PSYCHIATRY & NEUROLOGY

## 2025-02-17 PROCEDURE — APPSS30 APP SPLIT SHARED TIME 16-30 MINUTES: Performed by: PHYSICIAN ASSISTANT

## 2025-02-17 PROCEDURE — 1240000000 HC EMOTIONAL WELLNESS R&B

## 2025-02-17 PROCEDURE — 99232 SBSQ HOSP IP/OBS MODERATE 35: CPT | Performed by: PSYCHIATRY & NEUROLOGY

## 2025-02-17 PROCEDURE — 90833 PSYTX W PT W E/M 30 MIN: CPT | Performed by: PSYCHIATRY & NEUROLOGY

## 2025-02-17 RX ADMIN — CLOMIPRAMINE HYDROCHLORIDE 50 MG: 50 CAPSULE ORAL at 21:54

## 2025-02-17 RX ADMIN — PRAZOSIN HYDROCHLORIDE 2 MG: 1 CAPSULE ORAL at 21:53

## 2025-02-17 RX ADMIN — CARIPRAZINE 3 MG: 3 CAPSULE, GELATIN COATED ORAL at 05:54

## 2025-02-17 RX ADMIN — BENZTROPINE MESYLATE 0.5 MG: 1 TABLET ORAL at 21:54

## 2025-02-17 RX ADMIN — BENZTROPINE MESYLATE 0.5 MG: 1 TABLET ORAL at 05:54

## 2025-02-17 ASSESSMENT — PAIN SCALES - GENERAL: PAINLEVEL_OUTOF10: 0

## 2025-02-17 NOTE — GROUP NOTE
Group Therapy Note    Date: 2/17/2025    Group Start Time: 0900  Group End Time: 0930  Group Topic: Community Meeting    [facility]    Nancy Pradhan CTRS        Group Therapy Note    Attendees: 6       Patient's Goal:  Go from a 5.5 to a 4 from negative thoughts.     Notes:  pt. Was pleasant and actively engaged in the group discussion. He reports that he wants to figure out how to have better control over his thoughts so that he is not focusing on negative things. Pt. Was educated on coping skills. He was receptive of the education.     Status After Intervention:  Improved    Participation Level: Active Listener and Interactive    Participation Quality: Appropriate, Attentive, Sharing, and Supportive      Speech:  normal      Thought Process/Content: Logical  Linear      Affective Functioning: Congruent      Mood: euthymic      Level of consciousness:  Alert, Oriented x4, and Attentive      Response to Learning: Able to verbalize current knowledge/experience, Able to verbalize/acknowledge new learning, Able to retain information, Capable of insight, Able to change behavior, and Progressing to goal      Endings: None Reported    Modes of Intervention: Education      Discipline Responsible: Recreational Therapist      Signature:  MARCELLO Marlow

## 2025-02-17 NOTE — GROUP NOTE
Group Therapy Note    Date: 2/17/2025    Group Start Time: 1100  Group End Time: 1130  Group Topic: Recreational    [facility]    Nancy Pradhan CTRS        Group Therapy Note    Attendees: 3       Patient's Goal: To develop coping skills, and improve insight and judgement skills through an interactive game of Hands-On Mobile      Notes: Pt is engaging in group therapy conversation, Pt. Was educated on the goals and the outcomes of the group and is pleasant and cooperative with peers. Pt mood and affect brightens with discussion of coping skills and personal insight on situations that he has control over in the community. The game  Pt is able to offer appropriate support and engage in conversation appropriately      Status After Intervention:  Improved    Participation Level: Active Listener and Interactive    Participation Quality: Appropriate, Attentive, Sharing, and Supportive      Speech:  normal      Thought Process/Content: Logical  Linear      Affective Functioning: Congruent      Mood: euthymic      Level of consciousness:  Alert, Oriented x4, and Attentive      Response to Learning: Able to verbalize current knowledge/experience, Able to verbalize/acknowledge new learning, Able to retain information, Capable of insight, Able to change behavior, and Progressing to goal      Endings: None Reported    Modes of Intervention: Education, Support, Socialization, Exploration, Clarifying, and Problem-solving      Discipline Responsible: Recreational Therapist      Signature:  MARCELLO Marlow

## 2025-02-17 NOTE — PATIENT CARE CONFERENCE
Behavioral Health   Day 3 Interdisciplinary Treatment Plan NOTE    Review Date & Time: 2/17/25 1528    Patient was in treatment team    Admission Type:   Admission Type: Voluntary    Reason for admission:  Reason for Admission: MDD  Estimated Length of Stay Update:  2-3 days  Estimated Discharge Date Update: 2/21/25    Patient Strengths/Barriers  Strengths (Must Choose Two): Support from family, Support from friends  Barriers: Support of organized community  Addictive Behavior:Addictive Behavior  In the Past 3 Months, Have You Felt or Has Someone Told You That You Have a Problem With  : Sex/pornography, Gambling  Medical Problems:  Past Medical History:   Diagnosis Date    Arthritis     Depression     GERD (gastroesophageal reflux disease)     Mono exposure        Risk:  Fall Risk   Mo Scale Mo Scale Score: 22    Status EXAM:   Mental Status and Behavioral Exam  Normal: No  Level of Assistance: Independent/Self  Facial Expression: Flat  Affect: Appropriate  Level of Consciousness: Alert  Frequency of Checks: 4 times per hour, close  Mood:Normal: No  Mood: Depressed (rated at 7)  Motor Activity:Normal: Yes  Eye Contact: Good  Observed Behavior: Cooperative  Sexual Misconduct History: Current - no  Preception: [place] to person, [place] to time, [place] to place, [place] to situation  Attention:Normal: Yes  Thought Processes: Unremarkable  Thought Content:Normal: Yes  Thought Content: Other (comment) (Negative thoughts;suicidal)  Depression Symptoms: Loss of interest  Anxiety Symptoms: Generalized  Roxie Symptoms: No problems reported or observed.  Hallucinations: None  Delusions: No  Memory:Normal: Yes  Insight and Judgment: No  Insight and Judgment: Poor judgment    Daily Assessment Last Entry:   Daily Sleep (WDL): Within Defined Limits            Daily Nutrition (WDL): Within Defined Limits  Level of Assistance: Independent/Self    Patient Monitoring:  Frequency of Checks: 4 times per hour, close    [facility]

## 2025-02-17 NOTE — BH NOTE
Patient states that he cannot take any medications unless he is taking cogentin at the same time. States that no matter what the medication is, if he takes it without cogentin his hands will shake. Dr Perdomo notified and ordered for cogentin to be changed to BID received.

## 2025-02-18 PROCEDURE — APPSS30 APP SPLIT SHARED TIME 16-30 MINUTES: Performed by: PHYSICIAN ASSISTANT

## 2025-02-18 PROCEDURE — 1240000000 HC EMOTIONAL WELLNESS R&B

## 2025-02-18 PROCEDURE — 6370000000 HC RX 637 (ALT 250 FOR IP): Performed by: PSYCHIATRY & NEUROLOGY

## 2025-02-18 RX ORDER — HYDROXYZINE PAMOATE 25 MG/1
50 CAPSULE ORAL 4 TIMES DAILY PRN
Status: DISCONTINUED | OUTPATIENT
Start: 2025-02-18 | End: 2025-02-20 | Stop reason: HOSPADM

## 2025-02-18 RX ADMIN — CLOMIPRAMINE HYDROCHLORIDE 50 MG: 50 CAPSULE ORAL at 20:48

## 2025-02-18 RX ADMIN — BENZTROPINE MESYLATE 0.5 MG: 1 TABLET ORAL at 08:36

## 2025-02-18 RX ADMIN — BENZTROPINE MESYLATE 0.5 MG: 1 TABLET ORAL at 20:48

## 2025-02-18 RX ADMIN — PRAZOSIN HYDROCHLORIDE 2 MG: 1 CAPSULE ORAL at 20:48

## 2025-02-18 RX ADMIN — CARIPRAZINE 3 MG: 3 CAPSULE, GELATIN COATED ORAL at 08:36

## 2025-02-18 ASSESSMENT — PAIN SCALES - GENERAL: PAINLEVEL_OUTOF10: 0

## 2025-02-18 NOTE — GROUP NOTE
Group Therapy Note    Date: 2/18/2025    Group Start Time: 0900  Group End Time: 0930  Group Topic: Community Meeting    [facility]    Nancy Pradhan CTRS        Group Therapy Note    Attendees: 7       Patient's Goal:  visit with my family today    Notes:  pt. Was pleasant and actively engaged in the group discussion. Pt. Reports his excitement in seeing his family during visitation hours. Group was educated on SMART goals and he was able to verbally independently identify a daily goal. Pt.     Status After Intervention:  Improved    Participation Level: Active Listener and Interactive    Participation Quality: Appropriate, Attentive, Sharing, and Supportive      Speech:  normal      Thought Process/Content: Logical  Linear      Affective Functioning: Congruent      Mood: euthymic      Level of consciousness:  Alert, Oriented x4, and Attentive      Response to Learning: Able to verbalize current knowledge/experience, Able to verbalize/acknowledge new learning, Able to retain information, Capable of insight, Able to change behavior, and Progressing to goal      Endings: None Reported    Modes of Intervention: Education, Support, Socialization, Exploration, Clarifying, Problem-solving, and Activity      Discipline Responsible: Recreational Therapist      Signature:  MARCELLO Marlow

## 2025-02-18 NOTE — GROUP NOTE
Group Therapy Note    Date: 2/18/2025    Group Start Time: 1000  Group End Time: 1030  Group Topic: Recreational    [facility]    Nancy Pradhan CTRS        Group Therapy Note    Attendees: 7       Patient's Goal: To identify feelings associated with mental health     Notes: Pt. Participated in the co-treat (with social work) group discussion on wearing a mask in public to hide their mental health. Pt's were educated on potential triggers or warning signs that they may experience during stressful situations. Members were given handouts to put these thoughts on paper to use as a visualization aid. Pt. Will continue to be encouraged to participate in planned TR sessions.    Status After Intervention:  Improved    Participation Level: Active Listener and Interactive    Participation Quality: Appropriate, Attentive, Sharing, and Supportive      Speech:  normal      Thought Process/Content: Logical  Linear      Affective Functioning: Congruent      Mood: euthymic      Level of consciousness:  Alert, Oriented x4, and Attentive      Response to Learning: Able to verbalize current knowledge/experience, Able to verbalize/acknowledge new learning, Able to retain information, Capable of insight, Able to change behavior, and Progressing to goal      Endings: None Reported    Modes of Intervention: Education, Support, Socialization, Exploration, Clarifying, Problem-solving, and Activity      Discipline Responsible: Recreational Therapist      Signature:  MARCELLO Marlow

## 2025-02-18 NOTE — GROUP NOTE
Group Therapy Note    Date: 2/18/2025    Group Start Time: 1330  Group End Time: 1400  Group Topic: Healthy Living/Wellness    [facility]    Tawnya Devine LPN        Group Therapy Note    Attendees: 6       Notes:  attended    Status After Intervention:  Improved    Participation Level: Active Listener    Participation Quality: Appropriate and Attentive      Speech:  normal      Thought Process/Content: Logical      Affective Functioning: Flat      Level of consciousness:  Alert, Oriented x4, and Attentive      Response to Learning: Able to verbalize current knowledge/experience, Able to verbalize/acknowledge new learning, Able to retain information, and Capable of insight      Endings: None Reported    Modes of Intervention: Education      Discipline Responsible: nursing students      Signature:  Tawnya Devine LPN

## 2025-02-19 PROCEDURE — 90833 PSYTX W PT W E/M 30 MIN: CPT | Performed by: PSYCHIATRY & NEUROLOGY

## 2025-02-19 PROCEDURE — 6370000000 HC RX 637 (ALT 250 FOR IP): Performed by: PSYCHIATRY & NEUROLOGY

## 2025-02-19 PROCEDURE — APPSS30 APP SPLIT SHARED TIME 16-30 MINUTES: Performed by: PHYSICIAN ASSISTANT

## 2025-02-19 PROCEDURE — 99232 SBSQ HOSP IP/OBS MODERATE 35: CPT | Performed by: PSYCHIATRY & NEUROLOGY

## 2025-02-19 PROCEDURE — 1240000000 HC EMOTIONAL WELLNESS R&B

## 2025-02-19 RX ORDER — PRAZOSIN HYDROCHLORIDE 1 MG/1
4 CAPSULE ORAL NIGHTLY
Status: DISCONTINUED | OUTPATIENT
Start: 2025-02-19 | End: 2025-02-20 | Stop reason: HOSPADM

## 2025-02-19 RX ADMIN — HYDROXYZINE PAMOATE 50 MG: 25 CAPSULE ORAL at 21:15

## 2025-02-19 RX ADMIN — PRAZOSIN HYDROCHLORIDE 4 MG: 1 CAPSULE ORAL at 21:18

## 2025-02-19 RX ADMIN — CLOMIPRAMINE HYDROCHLORIDE 50 MG: 50 CAPSULE ORAL at 21:13

## 2025-02-19 RX ADMIN — HYDROXYZINE PAMOATE 50 MG: 25 CAPSULE ORAL at 01:09

## 2025-02-19 RX ADMIN — CARIPRAZINE 3 MG: 3 CAPSULE, GELATIN COATED ORAL at 08:21

## 2025-02-19 RX ADMIN — TRAZODONE HYDROCHLORIDE 50 MG: 50 TABLET ORAL at 21:14

## 2025-02-19 RX ADMIN — BENZTROPINE MESYLATE 0.5 MG: 1 TABLET ORAL at 08:21

## 2025-02-19 RX ADMIN — BENZTROPINE MESYLATE 0.5 MG: 1 TABLET ORAL at 21:13

## 2025-02-19 ASSESSMENT — PAIN SCALES - GENERAL: PAINLEVEL_OUTOF10: 0

## 2025-02-20 ENCOUNTER — HOSPITAL ENCOUNTER (OUTPATIENT)
Dept: PSYCHIATRY | Age: 25
Discharge: HOME | End: 2025-02-20

## 2025-02-20 VITALS
DIASTOLIC BLOOD PRESSURE: 76 MMHG | HEART RATE: 104 BPM | OXYGEN SATURATION: 99 % | WEIGHT: 185 LBS | RESPIRATION RATE: 16 BRPM | HEIGHT: 65 IN | SYSTOLIC BLOOD PRESSURE: 119 MMHG | BODY MASS INDEX: 30.82 KG/M2 | TEMPERATURE: 98 F

## 2025-02-20 PROCEDURE — 5130000000 HC BRIDGE APPOINTMENT

## 2025-02-20 PROCEDURE — 99239 HOSP IP/OBS DSCHRG MGMT >30: CPT | Performed by: PSYCHIATRY & NEUROLOGY

## 2025-02-20 PROCEDURE — 6370000000 HC RX 637 (ALT 250 FOR IP): Performed by: PSYCHIATRY & NEUROLOGY

## 2025-02-20 RX ORDER — PRAZOSIN HYDROCHLORIDE 2 MG/1
4 CAPSULE ORAL NIGHTLY
Qty: 60 CAPSULE | Refills: 0 | Status: SHIPPED | OUTPATIENT
Start: 2025-02-20

## 2025-02-20 RX ADMIN — CARIPRAZINE 3 MG: 3 CAPSULE, GELATIN COATED ORAL at 08:48

## 2025-02-20 RX ADMIN — BENZTROPINE MESYLATE 0.5 MG: 1 TABLET ORAL at 08:48

## 2025-02-20 ASSESSMENT — PAIN SCALES - GENERAL: PAINLEVEL_OUTOF10: 0

## 2025-02-20 ASSESSMENT — PAIN - FUNCTIONAL ASSESSMENT: PAIN_FUNCTIONAL_ASSESSMENT: ACTIVITIES ARE NOT PREVENTED
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
## 2025-02-20 NOTE — GROUP NOTE
Group Therapy Note    Date: 2/20/2025    Group Start Time: 1000  Group End Time: 1030  Group Topic: Recreational    [facility]    Nancy Pradhan CTRS        Group Therapy Note    Attendees: 11       Patient's Goal:  To enhance participants' knowledge about mental health topics by engaging in a Mental Health Jeopardy game, improving awareness of key mental health issues    Notes: Pt. Was actively engaged in a Mental Health Jeopardy game participants will be able to correctly answer related to mental health topics, demonstrating improved understanding of key mental health concepts such as true or false statements regarding mental health, coping strategies, and available mental health resources, Goal setting and conversations regarding policy and procedures on the unit.   Status After Intervention:  Improved    Participation Level: Active Listener and Interactive    Participation Quality: Appropriate, Attentive, Sharing, and Supportive      Speech:  normal      Thought Process/Content: Logical  Linear      Affective Functioning: Congruent      Mood: euthymic      Level of consciousness:  Alert, Oriented x4, and Attentive      Response to Learning: Able to verbalize current knowledge/experience, Able to verbalize/acknowledge new learning, Able to retain information, Capable of insight, Able to change behavior, and Progressing to goal      Endings: None Reported    Modes of Intervention: Education, Support, Socialization, Exploration, Clarifying, Problem-solving, and Activity      Discipline Responsible: Recreational Therapist      Signature:  MARCELLO Marlow

## 2025-02-20 NOTE — PROGRESS NOTES
Group Therapy Note    Date: 2/17/2025  Start Time: 0930  End Time:  1030  Number of Participants: 5    Type of Group: Psychotherapy    Notes:  Patient was present in group. Group members discussed the topic of control, identifying what they can and cannot control. Members discussed the role of control in their daily lives and in relation to their mental health. Members also discussed purpose in relation to the topic.     Status After Intervention:  Improved    Participation Level: Active Listener and Interactive    Participation Quality: Appropriate, Attentive, Sharing, and Supportive      Speech:  normal      Thought Process/Content: Logical  Linear      Affective Functioning: Congruent      Mood: euthymic      Level of consciousness:  Alert, Oriented x4, and Attentive      Response to Learning: Able to verbalize current knowledge/experience, Able to verbalize/acknowledge new learning, Able to retain information, Capable of insight, Able to change behavior, and Progressing to goal      Endings: None Reported    Modes of Intervention: Education, Support, Socialization, Exploration, Clarifying, and Problem-solving      Discipline Responsible: /Counselor      Signature:  SOL Cheng  
                                                                    Group Therapy Note    Date: 2/18/2025  Start Time: 0930  End Time:  1000  Number of Participants: 7    Type of Group: Psychotherapy    Notes:  Patient was present in group. Group members discussed the topic of masking their symptoms and true feelings. Members defined masking and identified reasons for masking their symptoms such as difficulties with being vulnerable with themselves and others, etc. Members discussed ways to be open with others and ourselves.     Status After Intervention:  Improved    Participation Level: Active Listener and Interactive    Participation Quality: Appropriate, Attentive, Sharing, and Supportive      Speech:  normal      Thought Process/Content: Logical  Linear      Affective Functioning: Congruent      Mood: euthymic      Level of consciousness:  Alert, Oriented x4, and Attentive      Response to Learning: Able to verbalize current knowledge/experience, Able to verbalize/acknowledge new learning, Able to retain information, Capable of insight, Able to change behavior, and Progressing to goal      Endings: None Reported    Modes of Intervention: Education, Support, Socialization, Exploration, Clarifying, and Problem-solving      Discipline Responsible: /Counselor      Signature:  SOL Cheng  
                                                                    Group Therapy Note    Date: 2/19/2025  Start Time: 0930  End Time:  1030  Number of Participants: 9    Type of Group: Psychotherapy    Notes:  Patient was present in group. Group members discussed the topic of identity. Members identified and discussed their views on themselves and how others view them affects them. Members discussed how their sense of self affects their mental health.     Status After Intervention:  Improved    Participation Level: Active Listener and Interactive    Participation Quality: Appropriate, Attentive, Sharing, and Supportive      Speech:  normal      Thought Process/Content: Logical  Linear      Affective Functioning: Congruent      Mood: euthymic      Level of consciousness:  Alert, Oriented x4, and Attentive      Response to Learning: Able to verbalize current knowledge/experience, Able to verbalize/acknowledge new learning, Able to retain information, Capable of insight, Able to change behavior, and Progressing to goal      Endings: None Reported    Modes of Intervention: Education, Support, Socialization, Exploration, Clarifying, and Problem-solving      Discipline Responsible: /Counselor      Signature:  SOL Cheng  
                                                           Psychosocial Assessment    Current Level of Psychosocial Functioning     Independent   Dependent      XXX  Minimal Assist    XXX    Comments:      Psychosocial High Risk Factors (check all that apply)    Unable to obtain meds   Chronic illness/pain    Substance abuse   Lack of Family Support   Financial stress     XXX Pt's therapy sessions are $80 per session  Isolation   Inadequate Community Resources  Suicide attempt(s)    XXX  Not taking medications   Victim of crime   Developmental Delay  Unable to manage personal needs    Age 65 or older   Homeless  No transportation   Readmission within 30 days  Unemployment    XXX  Traumatic Event    Family/Supports identified:   Pt identified 4 family members who are supportive    Sexual Orientation:      Heterosexual    Patient Strengths:    Engaged in treatment, seeking help, stable home, support available.     Patient Barriers:     Financial    Safety plan:     Contracts for safety    CMHC/ history:    XXX    Plan of Care:  medication management, group/individual therapies, family meetings, psycho -education, treatment team meetings to assist with stabilization    Initial Discharge Plan:  [facility] for psychiatry, An[facility] for therapy at [facility].    Clinical Summary:  Corey is a 24 year old male who has a history of of bipolar disorder, OCD with psychosis who presented to the ED for worsening suicidal thoughts with a plan to kill self by jumping off a bridge. Pt reports that his mental health had been stable for many months until about 2 weeks ago. He endorses strong feelings of hopelessness with a lack of motivation. Pt has been sleeping excessively. Pt identified situational stressors as being a failed [employer] business, \"people coming and going in his life. Pt also describes financial stressors which make it difficult to pay for his therapy. Pt receives SSI and works part time with [employer]. He 
                Goal Wrap-Up/Relaxation Group    Date: 2/15/2025  Start Time: 2000  End Time:  2020    Type of Group: Relaxation    States that goal today was: No goal. New admission    Goal for today was No goal set today    Patient Participated in group/activities appropriately      Signature: Angelina Serrano LPN   
                Goal Wrap-Up/Relaxation Group    Date: 2/20/2025  Start Time: 2000  End Time:  2020    Type of Group: Goal Wrap Up    States that goal today was: Attend evening group    Goal for today was Met    Patient Participated in group/activities appropriately      Signature: John Mendiola RN  
      Behavioral Services  Medicare Certification Upon Admission    I certify that this patient's inpatient psychiatric hospital admission is medically necessary for:    [x] (1) Treatment which could reasonably be expected to improve this patient's condition,       [x] (2) Or for diagnostic study;     AND     [x](2) The inpatient psychiatric services are provided while the individual is under the care of a physician and are included in the individualized plan of care.    Estimated length of stay/service 3-5 days    Plan for post-hospital care hc    Electronically signed by Charles Perdomo MD on 2/16/2025 at 9:37 AM      
24 hour chart review completed   
24 hour chart review completed.  
24hr chart review complete.   
Behavioral Health   Discharge Note    Pt discharged with followings belongings:   Dental Appliances: None  Vision - Corrective Lenses: None  Hearing Aid: None  Jewelry: None  Body Piercings Removed: N/A  Clothing: Footwear, Jacket/Coat, Pants, Shirt, Shorts, Socks, Sweater, Undergarments  Other Valuables: Keys, Other (Comment) (paper notebook, red duffle/cooler)   Valuables retrieved from safe, security envelope number and returned to patient.  Patient left department with writer via ambulating.  Discharged to [facility]. \"An Important Message from Medicare About Your Rights\" (IMM) form photocopy original from admission and provided to pt at least 4 hours prior to discharge yes. \"An Important Message from [employer] About Your Rights\" (IMM) form photocopy original from admission. N/A. If pt left within 4 hours of receiving 2nd delivery of IMM, this is because pt was agreeable with hospital discharge.  Patient/guardian education on aftercare instructions: Yes  Bridge appointment completed:  yes.  Reviewed Discharge Instructions with patient/family/nursing facility.  Patient/family verbalizes understanding and agreement with the discharge plan using the teachback method.   Information faxed to na by [facility] Patient/family verbalize understanding of AVS:Yes    Status EXAM upon discharge:  Mental Status and Behavioral Exam  Normal: Yes  Level of Assistance: Independent/Self  Facial Expression: Brightened  Affect: Appropriate  Level of Consciousness: Alert  Frequency of Checks: 4 times per hour, close  Mood:Normal: Yes  Mood: Other (comment) (Denies all.)  Motor Activity:Normal: Yes  Eye Contact: Good  Observed Behavior: Cooperative, Friendly  Sexual Misconduct History: Current - no  Preception: Jones to person, Jones to time, Jones to situation, Jones to place  Attention:Normal: Yes  Attention: Distractible  Thought Processes: Circumstantial  Thought Content:Normal: Yes  Thought Content:  (Denies problems)  Depression 
Corey,participated in the spirituality group. We did a relaxation exercise and had prayer. He was thankful for the Denominational time.   
Department of Psychiatry  Progress Note     Chief Complaint:  Suicidal ideation with a plan to jump off a building      PROGRESS:  Corey was seen in his room lying in bed.  He sat up and was cooperative with the interview.  He stated that he was doing better today  He stated that he is feeling better overall today. He feels the increase in Vraylar is helping him significantly  He rated his mood 7 out of 10 with 10 being the best for staff this morning.  He continues to feel depressed and anxious but stated it is better than when he came in  Corey endorsed having passive suicidal thoughts but denied any specific plan or intent.  He was able to contract for safety on the unit  He continues to struggle with poor energy and motivation but has been actively participating in groups and interacting with peers on the unit   He continues to feel hopeless and helpless at times but this is getting better  He reported that he slept okay last night. He woke up a few times. He stated the construction above the unit woke him up this morning.  Staff reported that he slept 7 hours broken last night.    He has been eating well here  He has been compliant with his medication.  He denied any side effects.   Corey has been out the unit today interacting with peers and attending groups.  He was encouraged to continue to do so  He has been talking on the phone and visiting with his family.  They are very supportive     Suicidal ideations: Passive without current plan or intent  Compliance with medications: good   Medication side effects: absent  ROS: Patient has new complaints:  no  Sleep quality: 7 hours broken last night per staff  Attending groups: yes      OBJECTIVE      Medications  Current Facility-Administered Medications: cariprazine hcl (VRAYLAR) capsule 3 mg, 3 mg, Oral, Daily  benztropine (COGENTIN) tablet 0.5 mg, 0.5 mg, Oral, BID  acetaminophen (TYLENOL) tablet 650 mg, 650 mg, Oral, Q4H PRN  ibuprofen (ADVIL;MOTRIN) tablet 400 
Department of Psychiatry  Progress Note     Chief Complaint:  Suicidal ideation with a plan to jump off a building      PROGRESS:  Corey was seen out on the unit. He was receptive to interaction and cooperative with the interview. He brightened with interaction   He stated that he was doing better today  He stated that he is feeling good overall today. He feels the increase in Vraylar continues to help him   He rated his mood 7 out of 10 with 10 being the best for staff this morning.  He reported that his depression and anxiety were better today  Corey denied any active suicidal thoughts and was able to contract for safety on the unit  He reported that his energy and motivation have been better.  He has been actively participating in groups and interacting with peers on the unit.  I observed him out on the unit all morning.  He stated that he is feeling more hopeful about his situation  He reported that he slept better last night. Staff reported that he slept 7 hours continuous last night.    He has been eating well here  He has been compliant with his medication.  He denied any side effects.   Corey has been out the unit today interacting with peers and attending groups.   He has been talking on the phone and visiting with his family.  They are very supportive  The providers discussed IOP at [facility] with him after he was discharged.  He was receptive to trying it.      Suicidal ideations: Denied active suicidal ideation   compliance with medications: good   Medication side effects: absent  ROS: Patient has new complaints:  no  Sleep quality: 7 hours continuous last night per staff  Attending groups: yes         OBJECTIVE      Medications  Current Facility-Administered Medications: prazosin (MINIPRESS) capsule 4 mg, 4 mg, Oral, Nightly  hydrOXYzine pamoate (VISTARIL) capsule 50 mg, 50 mg, Oral, 4x Daily PRN  cariprazine hcl (VRAYLAR) capsule 3 mg, 3 mg, Oral, Daily  benztropine (COGENTIN) tablet 0.5 mg, 0.5 mg, 
Department of Psychiatry  Progress Note     Chief Complaint:  Suicidal ideation with a plan to jump off a building     PROGRESS:  Corey was seen in his room lying in bed.  He sat up and was cooperative with the interview.  He stated that he was doing okay today  He mentioned that he had nightmares last night about \"stuff in my past.\"  He did not elaborate on this.  He rated his mood 7 out of 10 with 10 being the best for staff this morning.  He continues to feel depressed and anxious  Corey endorsed having passive suicidal thoughts but denied any specific plan or intent.  He was able to contract for safety on the unit  He continues to struggle with poor energy and motivation.  He said he has been trying to \"override\" that himself with the hope that the Vraylar will work at 3 mg.  He continues to feel hopeless and helpless at times but this is slowly getting better  He reported that he had trouble sleeping last night due to the nightmares.  Staff reported that he slept 8 hours continuous last night.  Staff also mentioned that he was in bed all day yesterday.  He has been eating well here  He has been compliant with his medication.  He denied any side effects.  He told nursing staff that Cogentin helps him significantly.  He said he does not take his pills with Cogentin his hands will shake.  Corey has been out the unit today interacting with peers and attending groups.  He was encouraged to continue to do so    Suicidal ideations: Passive without current plan or intent  Compliance with medications: good   Medication side effects: absent  ROS: Patient has new complaints:  no  Sleep quality: 8 hours continuous last night per staff  Attending groups: yes      OBJECTIVE      Medications  Current Facility-Administered Medications: cariprazine hcl (VRAYLAR) capsule 3 mg, 3 mg, Oral, Daily  benztropine (COGENTIN) tablet 0.5 mg, 0.5 mg, Oral, BID  acetaminophen (TYLENOL) tablet 650 mg, 650 mg, Oral, Q4H PRN  ibuprofen 
Did not attend evening groups   
[facility] CRISIS ASSESSMENT    SITUATION  Chief Complaint per ED Provider or Assigned Nurse report:   Pt suicidal     Chief Complaint per Patient report  Pt reports suicidal ideation for the last 1 -1 1/2 weeks. Two weeks ago he nearly jumped from a 5 story building but was \"talked down\". Pt said he's having very regular SI and feels very down/low.      Chief Complaint per Collateral contact report (Identify who and if they are present with the patient or if contacted by phone)  Pt's father, Elias Webster, was contacted and reports pt had been telling him and pt's mom Bonnie about how he' s been feeling this week, so he is pleased to hear that he brought himself to the ED. He asked that he be kept informed about disposition.     If collateral was not obtained why (if obtained then NA):  N/A    Provisional Diagnosis (ICD or DSM approved diagnosis only) : Major Depressive Disorder, recurrent, without psychosis. Per EPIC history, pt has previous diagnosis of Bipolar and Schizophrenia.       BACKGROUND  Risk, Psychosocial and Contextual Factors: (EXAMPLE - homeless, lack of social support, lack of family, unemployed, debt, legal, etc.): Pt is employed through [employer], but only part time so money is tight. Pt reports a recent break up with a girl and some feelings of lack of confidence related to not being able to maintain relationships long term.     Protective Factors:  Pt reports he lives alone and has family support, as well as support from friends. He is linked with psych services and currently on meds. Pt also seems very aware of when he feels \"off\" and recognizes when he is not mentally well.     Current MH Treatment: Psych services currently in place through Dr. Ricardo Galvan. Pt periodically seeks counseling sessions from [facility], when he has the money, as each session is $80 because they don't take his insurance. Pt also reports he is consistent with taking his medications.       Past MH Treatment or 
Pallavi HARVEY has reviewed and agrees with Angelina IQBAL LPN's shift  assessment.    Pallavi Bethea RN  2/18/2025  
Patient attended and participated in recreational group.    PCT Yarely uLz  
Patient reports no goal for today   
Spiritual Health History and Assessment/Progress Note  [facility]    (P) Behavioral Health,  ,  , (P) Follow up     Name: Corey Webster MRN: [id number]    Age: 24 y.o.     Sex: male   Language: English   Taoist: None   Bipolar disorder with severe depression (HCC)     Date: 2/19/2025            Total Time Calculated: (P) 60 min              Spiritual Assessment continued in [facility] ADULT [facility]E        Referral/Consult From: (P) Nurse   Encounter Overview/Reason: (P) Behavioral Health  Service Provided For: (P) Patient    Cecelia, Belief, Meaning:   Patient identifies as spiritual, is connected with a cecelia tradition or spiritual practice, and has beliefs or practices that help with coping during difficult times  Family/Friends No family/friends present      Importance and Influence:  Patient has spiritual/personal beliefs that influence decisions regarding their health  Family/Friends No family/friends present    Community:  Patient is connected with a spiritual community and Other: The patient explored multiple cecelia related questions and mental britney concerns related to his spiritual life.   Family/Friends No family/friends present    Assessment and Plan of Care:     Patient Interventions include: Facilitated expression of thoughts and feelings, Explored spiritual coping/struggle/distress, Engaged in theological reflection, Affirmed coping skills/support systems, Provided sacramental/Faith ritual, Facilitated life review and/ or legacy, and Other: The patient shared concerns related to experiencing God at all times verse from time to time.   Family/Friends Interventions include: No family/friends present    Patient Plan of Care: Spiritual Care available upon further referral  Family/Friends Plan of Care: No family/friends present    Electronically signed by Chaplain Carina on 2/19/2025 at 3:33 PM    
Spiritual Support Group Note    Number of Participants in Group: 3                                    Goal:   The spirituality group focused on utilizing a day of rest in one's weekly mental health routine. This lesson focused on sabbath as a day of reflection, journaling, community, and meditation. Sabbath as a day was also approached for the purpose of teaching boundaries in relation to time. Sabbath is also a boundary on unhealthy habits that inhibit mental health. The goal in the group is to establish a day every week to sabbath. This intentional creation of a day of reflection assists with creating a space to evaluate one's development and mental health care progress by using rest. Sabbathing was encouraged as a way to see ones stay in the hospital as well. The stay in behavioral health is a form of forced sabbath.    Topic:  [x] Spiritual Wellness and Self Care                  [] Hope                     [x] Connecting with Divine/Others        [] Thankfulness and Gratitude               []  Meaningfulness and Purpose               [] Forgiveness               [] Peace               [x] Connect to Community     [] Other:    Participation Level:   [x] Active Listener   [] Minimal   [] Monopolizing   [x] Interactive   [] No Participation   []  Other:     Attention:   [x] Alert   [] Distractible   [] Drowsy   [] Poor   [] Other:    Manner:   [x] Cooperative   [] Suspicious   [] Withdrawn   [] Guarded   [] Irritable   [] Inhospitable   [] Other:   
you leave the hospital? Own residence  Will need a return to work slip or FMLA paper completion? No Receives SSI and works part time with [employer].       GOALS UPDATE:   Time frame for Short-Term Goals: Daily    SOL Barker

## 2025-02-20 NOTE — DISCHARGE INSTRUCTIONS
Keep all follow-up appointments and take medications as directed.     Call the hope line if needed at :  [place] [phone].  [phone].  [phone].  [phone].  [phone].  [facility] [phone]    Symptoms to report to your Doctor:  Depression  Inability to eat, sleep, or have a bowel movement  Increased sleepiness and lethargy  Voices in your head  Any thoughts of harming self or others    Things to avoid:  Caffeine  Alcohol  No street drugs  Over the counter medications unless Ok'd by your physician or pharmacist.  Driving or operating machinery until full effects of your medications are known.  Driving or operating machinery if dizzy or drowsy from medications.  Use journal as directed.    Education:  Illness and medication teaching was completed.    Discharge Disposition: Patient was discharged to home and was transported by self. Patient was accompanied by self.      Information sent to next level of care:    ____Admission orders to Long Term Care    __x__Discharge instructions    ____Behavioral Services Assessment    ____Hand off Summary    ____History and Physical    ____Last dose MAR    ____Patient transfer form    ____Other       [facility] Hotline:  [phone]    Crisis phone numbers:  [place] [phone].  [phone]  [phone].  [place] [phone].  [place] [phone].  [facility] [phone].    [facility]  [address], [place] [id number] [phone]    [facility]  [address] [place], [place] [address] [place], [place]

## 2025-02-20 NOTE — PROGRESS NOTES
heterosexual     Suicidal Ideation:  Positve for suicidal ideation    Homicidal Ideation: Negative for homicidal ideation       Hallucinations/Delusions: Absent    Substance Use/Alcohol Use/Addiction:    [] Reports    [x] Denies     Trauma History:   [x] Reports  Pt reports rude  comments from family and friends is traumatizing for him .  [] Denies     Plan of Care:  Pt will be seen by IOP Provider for assessment for the IOP program .    Patient Goal: Patient reports wants to be 5% more positive .    Patient CSSR-S Score: high risk .    Preliminary Diagnosis and Criteria:  Bipolar disorder severe depressed.      Signed: NORA Puente               2/20/2025

## 2025-02-20 NOTE — PLAN OF CARE
Problem: Coping  Goal: Pt/Family able to verbalize concerns and demonstrate effective coping strategies  Description: INTERVENTIONS:  1. Assist patient/family to identify coping skills, available support systems and cultural and spiritual values  2. Provide emotional support, including active listening and acknowledgement of concerns of patient and caregivers  3. Reduce environmental stimuli, as able  4. Instruct patient/family in relaxation techniques, as appropriate  5. Assess for spiritual pain/suffering and initiate Spiritual Care, Psychosocial Clinical Specialist consults as needed  2/17/2025 1609 by Nancy Pradhan CTRS  Outcome: Progressing  Flowsheets (Taken 2/17/2025 1609)  Patient/family able to verbalize anxieties, fears, and concerns, and demonstrate effective coping:   Provide emotional support, including active listening and acknowledgement of concerns of patient and caregivers   Assist patient/family to identify coping skills, available support systems and cultural and spiritual values  Note: Pt has attended 3/3 group therapy sessions offered thus far today on the unit. Pt is engaging in group therapy conversation independently and socializes appropriately with his peers. Pt has been seen out on the unit interacting with others and participating in independent recreation resources out on the unit appropriately. Plan to continue to monitor progress and encourage group therapy participation and socialization on the unit.        
  Problem: Self Harm/Suicidality  Goal: Will have no self-injury during hospital stay  Description: INTERVENTIONS:  1.  Ensure constant observer at bedside with Q15M safety checks  2.  Maintain a safe environment  3.  Secure patient belongings  4.  Ensure family/visitors adhere to safety recommendations  5.  Ensure safety tray has been added to patient's diet order  6.  Every shift and PRN: Re-assess suicidal risk via Frequent Screener    2/17/2025 1007 by Estee Quispe RN  Outcome: Progressing  Flowsheets (Taken 2/17/2025 1007)  Will have no self-injury during hospital stay:   Ensure constant observer at bedside with Q15M safety checks   Ensure family/visitors adhere to safety recommendations   Every shift and PRN: Re-assess suicidal risk via Frequent Screener   Maintain a safe environment   Secure patient belongings   Ensure safety tray has been added to patient's diet order  2/16/2025 2234 by Sheryl Campoverde, RN  Outcome: Progressing  Flowsheets (Taken 2/16/2025 0730 by Vangie Haas LPN)  Will have no self-injury during hospital stay: Maintain a safe environment  Note: Denies suicidal thoughts or plans      Problem: Depression  Goal: Will be euthymic at discharge  Description: INTERVENTIONS:  1. Administer medication as ordered  2. Provide emotional support via 1:1 interaction with staff  3. Encourage involvement in milieu/groups/activities  4. Monitor for social isolation  2/17/2025 1007 by Estee Quispe RN  Outcome: Progressing  2/16/2025 2234 by Sheryl Campoverde RN  Outcome: Progressing  Note: States that mood is \"no motivation\"      Problem: Behavior  Goal: Pt/Family maintain appropriate behavior and adhere to behavioral management agreement, if implemented  Description: INTERVENTIONS:  1. Assess patient/family's coping skills and  non-compliant behavior (including use of illegal substances)  2. Notify security of behavior or suspected illegal substances which indicate the need for search of 
  Problem: Self Harm/Suicidality  Goal: Will have no self-injury during hospital stay  Description: INTERVENTIONS:  1.  Ensure constant observer at bedside with Q15M safety checks  2.  Maintain a safe environment  3.  Secure patient belongings  4.  Ensure family/visitors adhere to safety recommendations  5.  Ensure safety tray has been added to patient's diet order  6.  Every shift and PRN: Re-assess suicidal risk via Frequent Screener    2/17/2025 2320 by Angelina Serrano LPN  Outcome: Progressing  Flowsheets (Taken 2/17/2025 2320)  Will have no self-injury during hospital stay:   Maintain a safe environment   Every shift and PRN: Re-assess suicidal risk via Frequent Screener  Note: Patient denies suicidal ideations and makes no attempt to self harm thus far this shift.   2/17/2025 1007 by Estee Quispe RN  Outcome: Progressing  Flowsheets (Taken 2/17/2025 1007)  Will have no self-injury during hospital stay:   Ensure constant observer at bedside with Q15M safety checks   Ensure family/visitors adhere to safety recommendations   Every shift and PRN: Re-assess suicidal risk via Frequent Screener   Maintain a safe environment   Secure patient belongings   Ensure safety tray has been added to patient's diet order     Problem: Depression  Goal: Will be euthymic at discharge  Description: INTERVENTIONS:  1. Administer medication as ordered  2. Provide emotional support via 1:1 interaction with staff  3. Encourage involvement in milieu/groups/activities  4. Monitor for social isolation  2/17/2025 2320 by Angelina Serrano LPN  Outcome: Progressing  Note: Rates depression 1-2/10 with 10 being the worst.   2/17/2025 1007 by Estee Quispe RN  Outcome: Progressing     Problem: Behavior  Goal: Pt/Family maintain appropriate behavior and adhere to behavioral management agreement, if implemented  Description: INTERVENTIONS:  1. Assess patient/family's coping skills and  non-compliant behavior (including use of illegal 
  Problem: Self Harm/Suicidality  Goal: Will have no self-injury during hospital stay  Description: INTERVENTIONS:  1.  Ensure constant observer at bedside with Q15M safety checks  2.  Maintain a safe environment  3.  Secure patient belongings  4.  Ensure family/visitors adhere to safety recommendations  5.  Ensure safety tray has been added to patient's diet order  6.  Every shift and PRN: Re-assess suicidal risk via Frequent Screener    2/18/2025 1239 by Tawnya Devine LPN  Outcome: Progressing  Note: No self injury during hospital stay at this time, will continue to monitor  2/17/2025 2320 by Angelina Serrano LPN  Outcome: Progressing  Flowsheets (Taken 2/17/2025 2320)  Will have no self-injury during hospital stay:   Maintain a safe environment   Every shift and PRN: Re-assess suicidal risk via Frequent Screener  Note: Patient denies suicidal ideations and makes no attempt to self harm thus far this shift.      Problem: Depression  Goal: Will be euthymic at discharge  Description: INTERVENTIONS:  1. Administer medication as ordered  2. Provide emotional support via 1:1 interaction with staff  3. Encourage involvement in milieu/groups/activities  4. Monitor for social isolation  2/18/2025 1239 by Tawnya Devine LPN  Outcome: Progressing  Note: Patient denies depressive symptoms, rates his mood #7, has flat affect, will continue to monitor  2/17/2025 2320 by Angelina Serrano LPN  Outcome: Progressing  Note: Rates depression 1-2/10 with 10 being the worst.      Problem: Anxiety  Goal: Will report anxiety at manageable levels  Description: INTERVENTIONS:  1. Administer medication as ordered  2. Teach and rehearse alternative coping skills  3. Provide emotional support with 1:1 interaction with staff  2/18/2025 1239 by Tawnya Devine LPN  Outcome: Progressing  Note: Patient denies anxiety at this time, will monitor  2/17/2025 2320 by Angelina Serrano LPN  Outcome: Progressing  Flowsheets (Taken 2/17/2025 2320)  Will report 
  Problem: Self Harm/Suicidality  Goal: Will have no self-injury during hospital stay  Description: INTERVENTIONS:  1.  Ensure constant observer at bedside with Q15M safety checks  2.  Maintain a safe environment  3.  Secure patient belongings  4.  Ensure family/visitors adhere to safety recommendations  5.  Ensure safety tray has been added to patient's diet order  6.  Every shift and PRN: Re-assess suicidal risk via Frequent Screener    2/19/2025 0011 by Leslie Macario RN  Outcome: Progressing  Flowsheets (Taken 2/19/2025 0011)  Will have no self-injury during hospital stay:   Maintain a safe environment   Every shift and PRN: Re-assess suicidal risk via Frequent Screener  Note: Denies, no self injury noted at this time.  2/18/2025 1239 by Tawnya Dveine LPN  Outcome: Progressing  Note: No self injury during hospital stay at this time, will continue to monitor     Problem: Depression  Goal: Will be euthymic at discharge  Description: INTERVENTIONS:  1. Administer medication as ordered  2. Provide emotional support via 1:1 interaction with staff  3. Encourage involvement in milieu/groups/activities  4. Monitor for social isolation  2/19/2025 0011 by Leslie Macario, RN  Outcome: Progressing  Note: Denies.   2/18/2025 1239 by Tawnya Devine LPN  Outcome: Progressing  Note: Patient denies depressive symptoms, rates his mood #7, has flat affect, will continue to monitor     Problem: Behavior  Goal: Pt/Family maintain appropriate behavior and adhere to behavioral management agreement, if implemented  Description: INTERVENTIONS:  1. Assess patient/family's coping skills and  non-compliant behavior (including use of illegal substances)  2. Notify security of behavior or suspected illegal substances which indicate the need for search of the family and/or belongings  3. Encourage verbalization of thoughts and concerns in a socially appropriate manner  4. Utilize positive, consistent limit setting strategies 
  Problem: Self Harm/Suicidality  Goal: Will have no self-injury during hospital stay  Description: INTERVENTIONS:  1.  Ensure constant observer at bedside with Q15M safety checks  2.  Maintain a safe environment  3.  Secure patient belongings  4.  Ensure family/visitors adhere to safety recommendations  5.  Ensure safety tray has been added to patient's diet order  6.  Every shift and PRN: Re-assess suicidal risk via Frequent Screener    2/19/2025 1006 by Lola Weaver, RN  Outcome: Progressing  Note: No self harm behaviors were observed or reported so far this shift. Remains on every 15 minutes precautions for safety.  Patient denies suicidal ideations at present time      2/19/2025 0011 by Leslie Macario RN  Outcome: Progressing  Flowsheets (Taken 2/19/2025 0011)  Will have no self-injury during hospital stay:   Maintain a safe environment   Every shift and PRN: Re-assess suicidal risk via Frequent Screener  Note: Denies, no self injury noted at this time.     Problem: Depression  Goal: Will be euthymic at discharge  Description: INTERVENTIONS:  1. Administer medication as ordered  2. Provide emotional support via 1:1 interaction with staff  3. Encourage involvement in milieu/groups/activities  4. Monitor for social isolation  2/19/2025 1006 by Lola Weaver, RN  Outcome: Progressing  Note: Patient reports mood 7/10 with 10 being normal. Has bright affect. Speech clear. good eye contact. Reports has hope for future and identifies family  as their support system.  Patient denies depression at present time.    2/19/2025 0011 by Leslie Macario RN  Outcome: Progressing  Note: Denies.      Problem: Behavior  Goal: Pt/Family maintain appropriate behavior and adhere to behavioral management agreement, if implemented  Description: INTERVENTIONS:  1. Assess patient/family's coping skills and  non-compliant behavior (including use of illegal substances)  2. Notify security of behavior or suspected illegal 
  Problem: Self Harm/Suicidality  Goal: Will have no self-injury during hospital stay  Description: INTERVENTIONS:  1.  Ensure constant observer at bedside with Q15M safety checks  2.  Maintain a safe environment  3.  Secure patient belongings  4.  Ensure family/visitors adhere to safety recommendations  5.  Ensure safety tray has been added to patient's diet order  6.  Every shift and PRN: Re-assess suicidal risk via Frequent Screener    2/20/2025 0006 by John Mendiola RN  Outcome: Progressing  Flowsheets (Taken 2/19/2025 2316)  Will have no self-injury during hospital stay: Maintain a safe environment  Note: Denies being self injurious   2/19/2025 1006 by Lola Weaver RN  Outcome: Progressing  Note: No self harm behaviors were observed or reported so far this shift. Remains on every 15 minutes precautions for safety.  Patient denies suicidal ideations at present time         Problem: Depression  Goal: Will be euthymic at discharge  Description: INTERVENTIONS:  1. Administer medication as ordered  2. Provide emotional support via 1:1 interaction with staff  3. Encourage involvement in milieu/groups/activities  4. Monitor for social isolation  2/20/2025 0006 by John Mendiola RN  Outcome: Progressing  Note: Denies depression   2/19/2025 1006 by Lola Weaver RN  Outcome: Progressing  Note: Patient reports mood 7/10 with 10 being normal. Has bright affect. Speech clear. good eye contact. Reports has hope for future and identifies family  as their support system.  Patient denies depression at present time.       Problem: Behavior  Goal: Pt/Family maintain appropriate behavior and adhere to behavioral management agreement, if implemented  Description: INTERVENTIONS:  1. Assess patient/family's coping skills and  non-compliant behavior (including use of illegal substances)  2. Notify security of behavior or suspected illegal substances which indicate the need for search of the family and/or 
between patient's view, family's view, and healthcare provider's view of condition  2. Facilitate patient and family articulation of goals for care  3. Help patient and family identify pros/cons of alternative solutions  4. Provide information as requested by patient/family  5. Respect patient/family right to receive or not to receive information  6. Serve as a liaison between patient and family and health care team  7. Initiate Consults from Ethics, Palliative Care or initiate Family Care Conference as is appropriate  Outcome: Progressing  Flowsheets (Taken 2/16/2025 0873)  Patient/family able to effectively weigh alternatives and participate in decision making related to treatment and care:   Determine when there are differences between patient's view, family's view, and healthcare provider's view of condition   Facilitate patient and family articulation of goals for care   Help patient and family identify pros/cons of alternative solutions   Care plan reviewed with patient.  Patient verbalize understanding of the plan of care and contribute to goal setting.    
family's view, and healthcare provider's view of condition   Facilitate patient and family articulation of goals for care   Help patient and family identify pros/cons of alternative solutions     Problem: Pain  Goal: Verbalizes/displays adequate comfort level or baseline comfort level  Outcome: Progressing  Flowsheets (Taken 2/16/2025 2234)  Verbalizes/displays adequate comfort level or baseline comfort level: Encourage patient to monitor pain and request assistance  Note: Denies pain      Care plan reviewed with patient .  Patient  verbalize understanding of the plan of care and contribute to goal setting.

## 2025-02-21 ENCOUNTER — TELEPHONE (OUTPATIENT)
Age: 25
End: 2025-02-21

## 2025-05-19 ENCOUNTER — HOSPITAL ENCOUNTER (EMERGENCY)
Age: 25
Discharge: HOME | End: 2025-05-19
Payer: MEDICARE

## 2025-05-19 VITALS
BODY MASS INDEX: 31.65 KG/M2 | TEMPERATURE: 98.5 F | HEIGHT: 65 IN | RESPIRATION RATE: 17 BRPM | WEIGHT: 190 LBS | DIASTOLIC BLOOD PRESSURE: 68 MMHG | HEART RATE: 100 BPM | OXYGEN SATURATION: 99 % | SYSTOLIC BLOOD PRESSURE: 114 MMHG

## 2025-05-19 DIAGNOSIS — F48.9 MENTAL HEALTH PROBLEM: Primary | ICD-10-CM

## 2025-05-19 DIAGNOSIS — F10.920 ACUTE ALCOHOLIC INTOXICATION WITHOUT COMPLICATION: ICD-10-CM

## 2025-05-19 LAB
ALBUMIN SERPL BCG-MCNC: 4.9 G/DL (ref 3.4–4.9)
ALP SERPL-CCNC: 88 U/L (ref 40–129)
ALT SERPL W/O P-5'-P-CCNC: 65 U/L (ref 10–50)
AMPHETAMINES UR QL SCN: NEGATIVE
ANION GAP SERPL CALC-SCNC: 17 MEQ/L (ref 8–16)
APAP SERPL-MCNC: < 5 UG/ML (ref 10–30)
AST SERPL-CCNC: 47 U/L (ref 10–50)
BARBITURATES UR QL SCN: NEGATIVE
BASOPHILS ABSOLUTE: 0 THOU/MM3 (ref 0–0.1)
BASOPHILS NFR BLD AUTO: 0.4 %
BENZODIAZ UR QL SCN: NEGATIVE
BILIRUB CONJ SERPL-MCNC: 0.2 MG/DL (ref 0–0.2)
BILIRUB SERPL-MCNC: 1.3 MG/DL (ref 0.3–1.2)
BILIRUB UR QL STRIP.AUTO: NEGATIVE
BUN SERPL-MCNC: 10 MG/DL (ref 8–23)
BZE UR QL SCN: NEGATIVE
CALCIUM SERPL-MCNC: 9.7 MG/DL (ref 8.6–10)
CANNABINOIDS UR QL SCN: NEGATIVE
CHARACTER UR: CLEAR
CHLORIDE SERPL-SCNC: 106 MEQ/L (ref 98–111)
CO2 SERPL-SCNC: 17 MEQ/L (ref 22–29)
COLOR, UA: YELLOW
CREAT SERPL-MCNC: 0.8 MG/DL (ref 0.7–1.2)
DEPRECATED RDW RBC AUTO: 37.1 FL (ref 35–45)
EOSINOPHIL NFR BLD AUTO: 2 %
EOSINOPHILS ABSOLUTE: 0.1 THOU/MM3 (ref 0–0.4)
ERYTHROCYTE [DISTWIDTH] IN BLOOD BY AUTOMATED COUNT: 12.3 % (ref 11.5–14.5)
ETHANOL SERPL-MCNC: 0.06 % (ref 0–0.08)
ETHANOL SERPL-MCNC: 0.12 % (ref 0–0.08)
FENTANYL: NEGATIVE
GFR SERPL CREATININE-BSD FRML MDRD: > 90 ML/MIN/1.73M2
GLUCOSE SERPL-MCNC: 124 MG/DL (ref 74–109)
GLUCOSE UR QL STRIP.AUTO: NEGATIVE MG/DL
HCT VFR BLD AUTO: 49.9 % (ref 42–52)
HGB BLD-MCNC: 17.6 GM/DL (ref 14–18)
HGB UR QL STRIP.AUTO: NEGATIVE
IMM GRANULOCYTES # BLD AUTO: 0.03 THOU/MM3 (ref 0–0.07)
IMM GRANULOCYTES NFR BLD AUTO: 0.4 %
KETONES UR QL STRIP.AUTO: NEGATIVE
LYMPHOCYTES ABSOLUTE: 2.1 THOU/MM3 (ref 1–4.8)
LYMPHOCYTES NFR BLD AUTO: 30.1 %
MCH RBC QN AUTO: 29.4 PG (ref 26–33)
MCHC RBC AUTO-ENTMCNC: 35.3 GM/DL (ref 32.2–35.5)
MCV RBC AUTO: 83.3 FL (ref 80–94)
MONOCYTES ABSOLUTE: 0.5 THOU/MM3 (ref 0.4–1.3)
MONOCYTES NFR BLD AUTO: 7.9 %
NEUTROPHILS ABSOLUTE: 4.1 THOU/MM3 (ref 1.8–7.7)
NEUTROPHILS NFR BLD AUTO: 59.2 %
NITRITE UR QL STRIP: NEGATIVE
NRBC BLD AUTO-RTO: 0 /100 WBC
OPIATES UR QL SCN: NEGATIVE
OSMOLALITY SERPL CALC.SUM OF ELEC: 279.9 MOSMOL/KG (ref 275–300)
OXYCODONE: NEGATIVE
PCP UR QL SCN: NEGATIVE
PH UR STRIP.AUTO: 6.5 [PH] (ref 5–9)
PLATELET # BLD AUTO: 327 THOU/MM3 (ref 130–400)
PMV BLD AUTO: 9.3 FL (ref 9.4–12.4)
POTASSIUM SERPL-SCNC: 3.8 MEQ/L (ref 3.5–5.2)
PROT SERPL-MCNC: 8.1 G/DL (ref 6.4–8.3)
PROT UR STRIP.AUTO-MCNC: NEGATIVE MG/DL
RBC # BLD AUTO: 5.99 MILL/MM3 (ref 4.7–6.1)
SALICYLATES SERPL-MCNC: 0.6 MG/DL (ref 2–10)
SODIUM SERPL-SCNC: 140 MEQ/L (ref 135–145)
SP GR UR REFRACT.AUTO: 1.01 (ref 1–1.03)
TSH SERPL DL<=0.05 MIU/L-ACNC: 1.67 UIU/ML (ref 0.27–4.2)
UROBILINOGEN, URINE: 0.2 EU/DL (ref 0–1)
WBC # BLD AUTO: 6.9 THOU/MM3 (ref 4.8–10.8)
WBC #/AREA URNS HPF: NEGATIVE /[HPF]

## 2025-05-19 PROCEDURE — 82248 BILIRUBIN DIRECT: CPT

## 2025-05-19 PROCEDURE — 85025 COMPLETE CBC W/AUTO DIFF WBC: CPT

## 2025-05-19 PROCEDURE — 99285 EMERGENCY DEPT VISIT HI MDM: CPT

## 2025-05-19 PROCEDURE — 80179 DRUG ASSAY SALICYLATE: CPT

## 2025-05-19 PROCEDURE — 84443 ASSAY THYROID STIM HORMONE: CPT

## 2025-05-19 PROCEDURE — 81003 URINALYSIS AUTO W/O SCOPE: CPT

## 2025-05-19 PROCEDURE — 80053 COMPREHEN METABOLIC PANEL: CPT

## 2025-05-19 PROCEDURE — 80143 DRUG ASSAY ACETAMINOPHEN: CPT

## 2025-05-19 PROCEDURE — 36415 COLL VENOUS BLD VENIPUNCTURE: CPT

## 2025-05-19 PROCEDURE — 82077 ASSAY SPEC XCP UR&BREATH IA: CPT

## 2025-05-19 PROCEDURE — 80307 DRUG TEST PRSMV CHEM ANLYZR: CPT

## 2025-05-19 ASSESSMENT — LIFESTYLE VARIABLES
HOW MANY STANDARD DRINKS CONTAINING ALCOHOL DO YOU HAVE ON A TYPICAL DAY: PATIENT DOES NOT DRINK
HOW OFTEN DO YOU HAVE A DRINK CONTAINING ALCOHOL: NEVER

## 2025-05-19 ASSESSMENT — SLEEP AND FATIGUE QUESTIONNAIRES
AVERAGE NUMBER OF SLEEP HOURS: 7
DO YOU USE A SLEEP AID: YES
DO YOU HAVE DIFFICULTY SLEEPING: YES
SLEEP PATTERN: NIGHTMARES/TERRORS

## 2025-05-19 ASSESSMENT — PATIENT HEALTH QUESTIONNAIRE - PHQ9
SUM OF ALL RESPONSES TO PHQ QUESTIONS 1-9: 2
1. LITTLE INTEREST OR PLEASURE IN DOING THINGS: SEVERAL DAYS
SUM OF ALL RESPONSES TO PHQ QUESTIONS 1-9: 2
2. FEELING DOWN, DEPRESSED OR HOPELESS: SEVERAL DAYS
SUM OF ALL RESPONSES TO PHQ QUESTIONS 1-9: 2
SUM OF ALL RESPONSES TO PHQ QUESTIONS 1-9: 2
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
## 2025-05-19 NOTE — ED NOTES
Pt standing by safe room door at this time. Pt requesting to have someone to talk to at this time.  Pt educated on DEYANIRA coming in to room as soon as possible. Continuous monitoring remains in place.

## 2025-05-19 NOTE — ED NOTES
Blood work collected. Pt resting on cot with eyes closed upon entrance into the room. Respirations even and unlabored. Continuous monitoring in place. Will monitor.

## 2025-05-19 NOTE — PROGRESS NOTES
[facility] CRISIS ASSESSMENT    PRESENT SITUATION  Chief Complaint per ED Provider or Assigned Nurse report:        Chief Complaint per Patient report  Concerns about mental health diagnosis     Chief Complaint per Collateral contact report (Identify who and if they are present with the patient or if contacted by phone)      If collateral was not obtained why (if obtained then NA):      Provisional Diagnosis (ICD or DSM approved diagnosis only) : Bipolar Disorder Severe Recurrent without Psychotic Features    PRESENT Psychosis:    Hallucinations:  Denies    Delusions:  Denies    PRESENT Suicidal Behavior (Include specific information below):      Verbal:  xxxx    Attempt:  Denies    Access to Weapons:   Denies    Access to the Means of self harm or harm to others identified:   Denies     C-SSRS Current Suicide Risk: Low, Moderate or High:    High      PAST Suicidal Behavior (Include specific information below):       Verbal:  xxxx    Attempts:   xxxx    Self-Injurious/Self-Mutilation: (Specify what, how often, last time, method, etc.)   Denies    Traumatic Event Within Past 2 Weeks: (Specify)  Denies    Current Abuse:  (type, perpetrator, systems involved, injuries, etc.)  Denies    CURRENT Violence/Aggression: (Specify)   Denies    PAST Violence/Aggression: (Specify)   Denies    Risk, Psychosocial and Contextual Factors: (EXAMPLE - homeless, lack of social support, lack of family, unemployed, debt, legal, etc.): Limited primary support person    Protective Factors:  Employment, Mormon    Current MH Treatment: [facility]      Past MH Treatment or Hospitalization (Previous 6 months):   [facility], [place]     Legal Involvement: (Specify)   Denies    Housing:   Stable housing    CPAP/Oxygen/Ambulation Difficulties Provide pertinent results HERE.  (\"See H&P\" or \"Record\" is not acceptable response): NA    Critical Lab Results (Provide pertinent results HERE.  \"See H&P\" or \"Record\" is not acceptable

## 2025-05-19 NOTE — DISCHARGE INSTRUCTIONS
Please follow-up with [facility] as discussed.    Take medications as prescribed.    Return to the emergency department for worsening symptoms, thoughts of hurting yourself or others, hearing or seeing things that are not there, or any other care concerns.
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
## 2025-05-19 NOTE — ED TRIAGE NOTES
Pt reports to the ED from home due to Pt reports every 5 hours having a suicidal thought that is in the back of pts mind. Pt states does not deal with it and knows should not do anything for it. Pt reports dwelling on suicidal thoughts after drinking. Pt reports drinking half a bottle of whiskey tonight. Pt states knows how would kill self but does not have a plan. Pt reports being stressed lately. Pt reports being consistent with current mental health issues. Pt reports wanting to get rid of thoughts so can continue to live a better life.

## 2025-07-03 ENCOUNTER — HOSPITAL ENCOUNTER (EMERGENCY)
Age: 25
Discharge: HOME | End: 2025-07-03
Attending: FAMILY MEDICINE
Payer: MEDICARE

## 2025-07-03 VITALS
SYSTOLIC BLOOD PRESSURE: 143 MMHG | HEART RATE: 121 BPM | TEMPERATURE: 97.7 F | DIASTOLIC BLOOD PRESSURE: 88 MMHG | OXYGEN SATURATION: 96 % | RESPIRATION RATE: 16 BRPM

## 2025-07-03 DIAGNOSIS — F32.1 CURRENT MODERATE EPISODE OF MAJOR DEPRESSIVE DISORDER, UNSPECIFIED WHETHER RECURRENT (HCC): Primary | ICD-10-CM

## 2025-07-03 DIAGNOSIS — F32.89 OTHER DEPRESSION: ICD-10-CM

## 2025-07-03 LAB
ALBUMIN SERPL BCG-MCNC: 4.2 G/DL (ref 3.4–4.9)
ALP SERPL-CCNC: 72 U/L (ref 40–129)
ALT SERPL W/O P-5'-P-CCNC: 69 U/L (ref 10–50)
AMPHETAMINES UR QL SCN: NEGATIVE
ANION GAP SERPL CALC-SCNC: 15 MEQ/L (ref 8–16)
AST SERPL-CCNC: 43 U/L (ref 10–50)
BARBITURATES UR QL SCN: NEGATIVE
BASOPHILS ABSOLUTE: 0 THOU/MM3 (ref 0–0.1)
BASOPHILS NFR BLD AUTO: 0.5 %
BENZODIAZ UR QL SCN: NEGATIVE
BILIRUB SERPL-MCNC: 0.9 MG/DL (ref 0.3–1.2)
BUN SERPL-MCNC: 7 MG/DL (ref 8–23)
BUPRENORPHINE URINE: NEGATIVE
BZE UR QL SCN: NEGATIVE
CALCIUM SERPL-MCNC: 8.8 MG/DL (ref 8.6–10)
CANNABINOIDS UR QL SCN: NEGATIVE
CHLORIDE SERPL-SCNC: 104 MEQ/L (ref 98–111)
CO2 SERPL-SCNC: 19 MEQ/L (ref 22–29)
CREAT SERPL-MCNC: 1 MG/DL (ref 0.7–1.2)
DEPRECATED RDW RBC AUTO: 37.2 FL (ref 35–45)
EOSINOPHIL NFR BLD AUTO: 0.8 %
EOSINOPHILS ABSOLUTE: 0 THOU/MM3 (ref 0–0.4)
ERYTHROCYTE [DISTWIDTH] IN BLOOD BY AUTOMATED COUNT: 12.3 % (ref 11.5–14.5)
ETHANOL SERPL-MCNC: < 0.01 % (ref 0–0.08)
FENTANYL: NEGATIVE
GFR SERPL CREATININE-BSD FRML MDRD: > 90 ML/MIN/1.73M2
GLUCOSE SERPL-MCNC: 99 MG/DL (ref 74–109)
HCT VFR BLD AUTO: 46.7 % (ref 42–52)
HGB BLD-MCNC: 16.7 GM/DL (ref 14–18)
IMM GRANULOCYTES # BLD AUTO: 0.02 THOU/MM3 (ref 0–0.07)
IMM GRANULOCYTES NFR BLD AUTO: 0.3 %
LYMPHOCYTES ABSOLUTE: 1.2 THOU/MM3 (ref 1–4.8)
LYMPHOCYTES NFR BLD AUTO: 20.4 %
MCH RBC QN AUTO: 30.1 PG (ref 26–33)
MCHC RBC AUTO-ENTMCNC: 35.8 GM/DL (ref 32.2–35.5)
MCV RBC AUTO: 84.1 FL (ref 80–94)
MONOCYTES ABSOLUTE: 0.4 THOU/MM3 (ref 0.4–1.3)
MONOCYTES NFR BLD AUTO: 6.7 %
NEUTROPHILS ABSOLUTE: 4.3 THOU/MM3 (ref 1.8–7.7)
NEUTROPHILS NFR BLD AUTO: 71.3 %
NRBC BLD AUTO-RTO: 0 /100 WBC
OPIATES UR QL SCN: NEGATIVE
OSMOLALITY SERPL CALC.SUM OF ELEC: 273.7 MOSMOL/KG (ref 275–300)
OXYCODONE: NEGATIVE
PCP UR QL SCN: NEGATIVE
PLATELET # BLD AUTO: 296 THOU/MM3 (ref 130–400)
PMV BLD AUTO: 9.3 FL (ref 9.4–12.4)
POTASSIUM SERPL-SCNC: 4.2 MEQ/L (ref 3.5–5.2)
PROT SERPL-MCNC: 7 G/DL (ref 6.4–8.3)
RBC # BLD AUTO: 5.55 MILL/MM3 (ref 4.7–6.1)
SODIUM SERPL-SCNC: 138 MEQ/L (ref 135–145)
WBC # BLD AUTO: 6.1 THOU/MM3 (ref 4.8–10.8)

## 2025-07-03 PROCEDURE — 82077 ASSAY SPEC XCP UR&BREATH IA: CPT

## 2025-07-03 PROCEDURE — 36415 COLL VENOUS BLD VENIPUNCTURE: CPT

## 2025-07-03 PROCEDURE — 99285 EMERGENCY DEPT VISIT HI MDM: CPT

## 2025-07-03 PROCEDURE — 80053 COMPREHEN METABOLIC PANEL: CPT

## 2025-07-03 PROCEDURE — 80307 DRUG TEST PRSMV CHEM ANLYZR: CPT

## 2025-07-03 PROCEDURE — 85025 COMPLETE CBC W/AUTO DIFF WBC: CPT

## 2025-07-03 ASSESSMENT — PATIENT HEALTH QUESTIONNAIRE - PHQ9
7. TROUBLE CONCENTRATING ON THINGS, SUCH AS READING THE NEWSPAPER OR WATCHING TELEVISION: NOT AT ALL
5. POOR APPETITE OR OVEREATING: NEARLY EVERY DAY
SUM OF ALL RESPONSES TO PHQ QUESTIONS 1-9: 14
SUM OF ALL RESPONSES TO PHQ QUESTIONS 1-9: 14
9. THOUGHTS THAT YOU WOULD BE BETTER OFF DEAD, OR OF HURTING YOURSELF: MORE THAN HALF THE DAYS
SUM OF ALL RESPONSES TO PHQ QUESTIONS 1-9: 14
1. LITTLE INTEREST OR PLEASURE IN DOING THINGS: MORE THAN HALF THE DAYS
10. IF YOU CHECKED OFF ANY PROBLEMS, HOW DIFFICULT HAVE THESE PROBLEMS MADE IT FOR YOU TO DO YOUR WORK, TAKE CARE OF THINGS AT HOME, OR GET ALONG WITH OTHER PEOPLE: VERY DIFFICULT
3. TROUBLE FALLING OR STAYING ASLEEP: NEARLY EVERY DAY
4. FEELING TIRED OR HAVING LITTLE ENERGY: NOT AT ALL
SUM OF ALL RESPONSES TO PHQ QUESTIONS 1-9: 12
6. FEELING BAD ABOUT YOURSELF - OR THAT YOU ARE A FAILURE OR HAVE LET YOURSELF OR YOUR FAMILY DOWN: MORE THAN HALF THE DAYS
2. FEELING DOWN, DEPRESSED OR HOPELESS: MORE THAN HALF THE DAYS
8. MOVING OR SPEAKING SO SLOWLY THAT OTHER PEOPLE COULD HAVE NOTICED. OR THE OPPOSITE, BEING SO FIGETY OR RESTLESS THAT YOU HAVE BEEN MOVING AROUND A LOT MORE THAN USUAL: NOT AT ALL

## 2025-07-03 ASSESSMENT — LIFESTYLE VARIABLES
HOW OFTEN DO YOU HAVE A DRINK CONTAINING ALCOHOL: MONTHLY OR LESS
HOW MANY STANDARD DRINKS CONTAINING ALCOHOL DO YOU HAVE ON A TYPICAL DAY: 1 OR 2

## 2025-07-03 ASSESSMENT — SLEEP AND FATIGUE QUESTIONNAIRES
DO YOU USE A SLEEP AID: NO
SLEEP PATTERN: DIFFICULTY FALLING ASLEEP;DISTURBED/INTERRUPTED SLEEP;NIGHTMARES/TERRORS;DIFFICULTY ARISING
AVERAGE NUMBER OF SLEEP HOURS: 12
DO YOU HAVE DIFFICULTY SLEEPING: YES

## 2025-07-03 NOTE — ED NOTES
Pt resting in cot with eyes closed at this time. Respirations even and unlabored. Provided pt with water upon request. Pt denies other needs at this time. Call light in reach.

## 2025-07-03 NOTE — PROGRESS NOTES
[facility] CRISIS ASSESSMENT    PRESENT SITUATION  Chief Complaint per ED Provider or Assigned Nurse report:   Suicidal.      Chief Complaint per Patient report : Patient reports \"Passive suicidal thoughts. Their like in the form of questions like would today be a good time to kill myself.\" Patient reports being driven by mom to ED; voluntary.  Patient reports \"They are more on and off. I have had them since I was [age] years old, and I am [age] so that should tell you something\" in regards to history of duration for suicidal ideations. Patient reports \"I have had more thoughts this week; there like more all day\" in regards to duration of suicidal ideations.     Chief Complaint per Collateral contact report (Identify who and if they are present with the patient or if contacted by phone)      Bonnie Webster; mother of patient Corey Webster, contacted via Telephone states \"He has depression, anxiety and a lot of germaphobia. He stayed in bed all day.\" \"We call and text him all day. He said he had suicidal thoughts and needed to check himself into the hospital.\" \"He lives in an apartment by himself. He said when he goes to the hospital it makes me feel better.\" \"I know a couple months ago; police got him off a building.\" \"I know he also lost his girlfriend.\"  Per patient report girlfriend is alive.     If collateral was not obtained why (if obtained then NA):   N/a    Provisional Diagnosis (ICD or DSM approved diagnosis only) :  Per EPIC; patient is diagnosed with Depression. Per Dr. RANDOLPH on 02/15/2025 patient is diagnosed with Bipolar II.    PRESENT Psychosis:    Hallucinations:  Denies    Delusions:  Denies    PRESENT Suicidal Behavior (Include specific information below):      Verbal:  Patient reports \"Passive suicidal thoughts. There like in the form of questions like would today be a good time to kill myself.\" Patient reports being driven by mom to ED; voluntary.  Patient reports \"They are more on and off. I have had

## 2025-07-03 NOTE — ED PROVIDER NOTES
EMERGENCY DEPARTMENT ENCOUNTER     CHIEF COMPLAINT   Chief Complaint   Patient presents with    Suicidal      HPI   Corey Webster is a 24 y.o. male with previous psychiatric admission earlier this year, who presents \"depressive negative thoughts\" over the past few days. He has been taking all of his medications but has been experiencing many stressors such as relationship breakup and loss of a childhood friend. Pt came to the ED accompanied by his mother Bonnie. Pt denies any active suicidal plan, just vague thoughts.    REVIEW OF SYSTEMS   Psychiatric: Denies auditory hallucinations or homicidal Ideations   Respiratory:No difficulty breathing or new cough   General:No fevers   Neurologic:No known syncope   GI: No vomiting or abdominal pain   See HPI for further details. All other review of systems otherwise negative.     PAST MEDICAL & SURGICAL HISTORY   Past Medical History:   Diagnosis Date    Arthritis     Depression     GERD (gastroesophageal reflux disease)     Mono exposure       Past Surgical History:   Procedure Laterality Date    APPENDECTOMY      CYST REMOVAL      knee    SHOULDER SURGERY Right       CURRENT MEDICATIONS   Current Outpatient Rx   Medication Sig Dispense Refill    cariprazine hcl (VRAYLAR) 3 MG CAPS capsule Take 1 capsule by mouth daily 30 capsule 0    prazosin (MINIPRESS) 2 MG capsule Take 2 capsules by mouth nightly 60 capsule 0    clomiPRAMINE (ANAFRANIL) 75 MG capsule Take 50 mg by mouth nightly      hydrOXYzine pamoate (VISTARIL) 50 MG capsule Take 1 capsule by mouth 4 times daily as needed for Itching 90 capsule 0    benztropine (COGENTIN) 0.5 MG tablet Take 1 tablet by mouth daily 60 tablet 0      ALLERGIES   Allergies   Allergen Reactions    Invega Sustenna [Paliperidone Palmitate Er] Anxiety     States increased anxiety, shaking and restlessness. Increased suicidal ideations.       SOCIAL & FAMILYHISTORY   Social History     Socioeconomic History    Marital status: Single    Number

## 2025-07-03 NOTE — ED NOTES
Pt to the ED via self. Patient presents with complaints of  \"passive\" suicidal thoughts. Patient states that he had a plan about 6 months ago to kill himself but denies any plans since then. Patient is alert and oriented x 4. Respirations are regular and unlabored. Family at the bedside and call light within reach. Patient placed in safe room that is ligature resistant with continuous monitoring in place. Provider notified, requested an assessment by behavioral health . Patient belongings secured in a locked lockers outside of the room. Explained suicide prevention precautions to the patient including constant observer.

## 2025-08-16 ENCOUNTER — APPOINTMENT (OUTPATIENT)
Dept: GENERAL RADIOLOGY | Age: 25
End: 2025-08-16
Payer: MEDICARE

## 2025-08-16 ENCOUNTER — HOSPITAL ENCOUNTER (EMERGENCY)
Age: 25
Discharge: HOME | End: 2025-08-16
Payer: MEDICARE

## 2025-08-16 VITALS
OXYGEN SATURATION: 95 % | DIASTOLIC BLOOD PRESSURE: 83 MMHG | SYSTOLIC BLOOD PRESSURE: 124 MMHG | RESPIRATION RATE: 18 BRPM | WEIGHT: 195 LBS | BODY MASS INDEX: 32.49 KG/M2 | HEIGHT: 65 IN | HEART RATE: 102 BPM | TEMPERATURE: 98.1 F

## 2025-08-16 DIAGNOSIS — R07.9 CHEST PAIN, UNSPECIFIED TYPE: Primary | ICD-10-CM

## 2025-08-16 LAB
ALBUMIN SERPL BCG-MCNC: 4.1 G/DL (ref 3.4–4.9)
ALP SERPL-CCNC: 77 U/L (ref 40–129)
ALT SERPL W/O P-5'-P-CCNC: 47 U/L (ref 10–50)
ANION GAP SERPL CALC-SCNC: 13 MEQ/L (ref 8–16)
AST SERPL-CCNC: 30 U/L (ref 10–50)
BASOPHILS ABSOLUTE: 0 THOU/MM3 (ref 0–0.1)
BASOPHILS NFR BLD AUTO: 0.4 %
BILIRUB SERPL-MCNC: 1.3 MG/DL (ref 0.3–1.2)
BUN SERPL-MCNC: 8 MG/DL (ref 8–23)
CALCIUM SERPL-MCNC: 9.4 MG/DL (ref 8.5–10.5)
CHLORIDE SERPL-SCNC: 102 MEQ/L (ref 98–111)
CO2 SERPL-SCNC: 23 MEQ/L (ref 22–29)
CREAT SERPL-MCNC: 0.9 MG/DL (ref 0.7–1.2)
DEPRECATED RDW RBC AUTO: 40.2 FL (ref 35–45)
EKG ATRIAL RATE: 95 BPM
EKG P AXIS: 75 DEGREES
EKG P-R INTERVAL: 146 MS
EKG Q-T INTERVAL: 330 MS
EKG QRS DURATION: 78 MS
EKG QTC CALCULATION (BAZETT): 414 MS
EKG R AXIS: 80 DEGREES
EKG T AXIS: 62 DEGREES
EKG VENTRICULAR RATE: 95 BPM
EOSINOPHIL NFR BLD AUTO: 1.1 %
EOSINOPHILS ABSOLUTE: 0.1 THOU/MM3 (ref 0–0.4)
ERYTHROCYTE [DISTWIDTH] IN BLOOD BY AUTOMATED COUNT: 13.1 % (ref 11.5–14.5)
FLUAV RNA RESP QL NAA+PROBE: NOT DETECTED
FLUBV RNA RESP QL NAA+PROBE: NOT DETECTED
GFR SERPL CREATININE-BSD FRML MDRD: > 90 ML/MIN/1.73M2
GLUCOSE SERPL-MCNC: 100 MG/DL (ref 74–109)
HCT VFR BLD AUTO: 48.5 % (ref 42–52)
HGB BLD-MCNC: 16.8 GM/DL (ref 14–18)
IMM GRANULOCYTES # BLD AUTO: 0.02 THOU/MM3 (ref 0–0.07)
IMM GRANULOCYTES NFR BLD AUTO: 0.2 %
LYMPHOCYTES ABSOLUTE: 1.1 THOU/MM3 (ref 1–4.8)
LYMPHOCYTES NFR BLD AUTO: 13.5 %
MCH RBC QN AUTO: 29.6 PG (ref 26–33)
MCHC RBC AUTO-ENTMCNC: 34.6 GM/DL (ref 32.2–35.5)
MCV RBC AUTO: 85.5 FL (ref 80–94)
MONOCYTES ABSOLUTE: 1.1 THOU/MM3 (ref 0.4–1.3)
MONOCYTES NFR BLD AUTO: 13 %
NEUTROPHILS ABSOLUTE: 6.1 THOU/MM3 (ref 1.8–7.7)
NEUTROPHILS NFR BLD AUTO: 71.8 %
NRBC BLD AUTO-RTO: 0 /100 WBC
OSMOLALITY SERPL CALC.SUM OF ELEC: 274.1 MOSMOL/KG (ref 275–300)
PLATELET # BLD AUTO: 285 THOU/MM3 (ref 130–400)
PMV BLD AUTO: 8.7 FL (ref 9.4–12.4)
POTASSIUM SERPL-SCNC: 4.1 MEQ/L (ref 3.5–5.2)
PROT SERPL-MCNC: 6.6 G/DL (ref 6.4–8.3)
RBC # BLD AUTO: 5.67 MILL/MM3 (ref 4.7–6.1)
SARS-COV-2 RNA RESP QL NAA+PROBE: NOT DETECTED
SODIUM SERPL-SCNC: 138 MEQ/L (ref 135–145)
TROPONIN, HIGH SENSITIVITY: < 6 NG/L (ref 0–12)
WBC # BLD AUTO: 8.5 THOU/MM3 (ref 4.8–10.8)

## 2025-08-16 PROCEDURE — 85025 COMPLETE CBC W/AUTO DIFF WBC: CPT

## 2025-08-16 PROCEDURE — 93005 ELECTROCARDIOGRAM TRACING: CPT

## 2025-08-16 PROCEDURE — 80053 COMPREHEN METABOLIC PANEL: CPT

## 2025-08-16 PROCEDURE — 84484 ASSAY OF TROPONIN QUANT: CPT

## 2025-08-16 PROCEDURE — 36415 COLL VENOUS BLD VENIPUNCTURE: CPT

## 2025-08-16 PROCEDURE — 6370000000 HC RX 637 (ALT 250 FOR IP)

## 2025-08-16 PROCEDURE — 93010 ELECTROCARDIOGRAM REPORT: CPT | Performed by: NUCLEAR MEDICINE

## 2025-08-16 PROCEDURE — 87636 SARSCOV2 & INF A&B AMP PRB: CPT

## 2025-08-16 PROCEDURE — 71046 X-RAY EXAM CHEST 2 VIEWS: CPT

## 2025-08-16 PROCEDURE — 99285 EMERGENCY DEPT VISIT HI MDM: CPT

## 2025-08-16 RX ORDER — OMEPRAZOLE 20 MG/1
20 CAPSULE, DELAYED RELEASE ORAL
Qty: 30 CAPSULE | Refills: 0 | Status: SHIPPED | OUTPATIENT
Start: 2025-08-16

## 2025-08-16 RX ORDER — ONDANSETRON 4 MG/1
4 TABLET, ORALLY DISINTEGRATING ORAL 3 TIMES DAILY PRN
Qty: 21 TABLET | Refills: 0 | Status: SHIPPED | OUTPATIENT
Start: 2025-08-16

## 2025-08-16 RX ADMIN — LIDOCAINE HYDROCHLORIDE: 20 SOLUTION ORAL at 07:05

## 2025-08-16 ASSESSMENT — ENCOUNTER SYMPTOMS
DIARRHEA: 0
COUGH: 1
EYES NEGATIVE: 1
SINUS PAIN: 0
VOMITING: 0
CHEST TIGHTNESS: 0
ALLERGIC/IMMUNOLOGIC NEGATIVE: 1
SORE THROAT: 0
NAUSEA: 0
SINUS PRESSURE: 0
ABDOMINAL PAIN: 0
CONSTIPATION: 0
ABDOMINAL DISTENTION: 0
SHORTNESS OF BREATH: 0

## 2025-08-16 ASSESSMENT — PAIN - FUNCTIONAL ASSESSMENT: PAIN_FUNCTIONAL_ASSESSMENT: 0-10

## 2025-08-16 ASSESSMENT — HEART SCORE: ECG: NORMAL

## 2025-08-16 ASSESSMENT — PAIN DESCRIPTION - LOCATION: LOCATION: CHEST

## 2025-08-16 ASSESSMENT — PAIN SCALES - GENERAL: PAINLEVEL_OUTOF10: 6
